# Patient Record
Sex: FEMALE | Race: WHITE | NOT HISPANIC OR LATINO | Employment: FULL TIME | URBAN - METROPOLITAN AREA
[De-identification: names, ages, dates, MRNs, and addresses within clinical notes are randomized per-mention and may not be internally consistent; named-entity substitution may affect disease eponyms.]

---

## 2017-03-17 ENCOUNTER — GENERIC CONVERSION - ENCOUNTER (OUTPATIENT)
Dept: OTHER | Facility: OTHER | Age: 42
End: 2017-03-17

## 2018-01-18 NOTE — PROGRESS NOTES
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)  4  Lipoma of torso (214 1) (D17 1)  5  Screening for iron deficiency anemia (V78 0) (Z13 0)  6  Encounter for screening for lipid disorder (V77 91) (Z13 220)  7  Morbid or severe obesity due to excess calories (278 01) (E66 01)    Plan  Encounter for screening for lipid disorder    · (1) LIPID PANEL FASTING W DIRECT LDL REFLEX; Status:Active - Retrospective By  Protocol Authorization; Requested for:19Apr2016;    · (LC) T4 and TSH; Status:Unauthorized - Requires Signature; Requested for:19Apr2016;   Encounter for screening mammogram for malignant neoplasm of breast    · Digital Bilateral Screening Mammogram With CAD; Status:Active; Requested  for:19Apr2016;   Encounter for vitamin deficiency screening    · (LC) Vitamin D, 25-Hydroxy, Total; Status:Active - Retrospective By Protocol  Authorization; Requested for:19Apr2016; Health Maintenance    · Begin a limited exercise program ; Status:Complete;   Done: 61DOY4358   · Brush your teeth 3 times a day and floss at least once a day ; Status:Complete;   Done:  78WZA0181   · Drink plenty of fluids ; Status:Complete;   Done: 28YNE5859   · We encourage all of our patients to exercise regularly  30 minutes of exercise or physical  activity five or more days a week is recommended for children and adults ;  Status:Complete;   Done: 56DQX8591   · We recommend routine visits to a dentist ; Status:Complete;   Done: 48EKU9773   · We recommend that you change your eating habits slowly ; Status:Complete;   Done:  76YNV7472   · We recommend that you follow the "Mediterranean diet "; Status:Complete;   Done:  65NJZ4972   · We recommend you modify your diet to achieve and maintain a healthy weight  Being  overweight may increase your risk for developing health problems such as diabetes,  heart disease, and cancer  Avoid high fat foods and eat a balanced diet rich  in fruits and vegetables  The combination of a reduced-calorie diet and increased  physical activity is recommended  Please let us know if you would like to  learn more about your nutrition and calorie needs, and additional options including  weight loss programs that can help you achieve your goals ; Status:Complete;   Done:  19Apr2016   · We recommend you modify your diet to achieve and maintain a healthy weight  Being  underweight may increase your risk of developing health problems from vitamin and  mineral deficiencies  We recommend a balanced diet rich in fruits and vegetables  You  may also consider increasing your calorie intake by eating more frequently or adding  nuts, avocados, and low-fat cheese or milk to your meals  Please let us know  if you would like to learn more about your nutrition and calorie needs, and additional  options to help you achieve your weight goals ; Status:Complete;   Done: 19Apr2016  Lipoma of torso    · US ABDOMINAL WALL; Status:Hold For - Scheduling; Requested for:19Apr2016; Morbid or severe obesity due to excess calories    · Some eating tips that can help you lose weight ; Status:Complete;   Done: 01CVR2911  Screening for iron deficiency anemia    · (1) COMPREHENSIVE METABOLIC PANEL; Status:Unauthorized - Requires Signature; Requested for:19Apr2016;    · (LC) CBC+Platelet+Hem Review; Status:Active - Retrospective By Protocol Authorization; Requested for:19Apr2016;     Discussion/Summary  health maintenance visit Currently, she eats an adequate diet and has an inadequate exercise regimen  Breast cancer screening: mammogram has been ordered  Colorectal cancer screening: colorectal cancer screening is not indicated  Osteoporosis screening: bone mineral density testing is not indicated  The immunizations are needed       1) schedule mammogram- routine screen for breast cancer  2) keep appt with clarita for pap in may  3) Tdap due  4) ultrasound abdomen - and surgical evaluation-likely lipoma  Chief Complaint  PT IS HERE FOR ANNUAL PHYSICAL  PT HAS ORDER FOR MAMMOGRAM       History of Present Illness  HM, Adult Female: The patient is being seen for a health maintenance evaluation  General Health: The patient's health since the last visit is described as good  She has regular dental visits  She complains of vision problems  Vision care includes wearing glasses  She denies hearing loss  Immunizations status: not up to date The patient needs the following immunization(s): tetanus vaccine  Lifestyle:  She does not have a healthy diet  She has weight concerns  She does not exercise regularly  She does not use tobacco  She consumes alcohol  She denies drug use  Reproductive health: the patient is premenopausal   she reports normal menses  Screening: cancer screening reviewed and updated  metabolic screening reviewed and updated  risk screening reviewed and updated  HPI: Patient is here for a well exam today  She was unable to lose any weight since her last visit in October  She had a sinusitis that had resolved without further issues  She denies any other illnesses over the winter  She has not been very active however plans on walking with the nice weather  She denies any chest pain or shortness of breath  She notices an area under her right breast that has been present for a few years  She had a CAT scan and ultrasound in 2012 but notices an increase in size  The area is not tender  Her bowel movements are normal  Her menstrual cycles are normal  She has a Pap scheduled with [facility] in May  She wears glasses for distance,  She complains of arthritis in her hands bilaterally  She does a lot of computer work  She takes no medications1        1 Amended By: Chick Goodell; Apr 19 2016 4:19 PM EST    Review of Systems    Constitutional: No fever, no chills, feels well, no tiredness, no recent weight gain or weight loss  Eyes: No complaints of eye pain, no red eyes, no eyesight problems, no discharge, no dry eyes, no itching of eyes  ENT: no complaints of earache, no loss of hearing, no nose bleeds, no nasal discharge, no sore throat, no hoarseness  Cardiovascular: No complaints of slow heart rate, no fast heart rate, no chest pain, no palpitations, no leg claudication, no lower extremity edema  Respiratory: No complaints of shortness of breath, no wheezing, no cough, no SOB on exertion, no orthopnea, no PND  Gastrointestinal: No complaints of abdominal pain, no constipation, no nausea or vomiting, no diarrhea, no bloody stools  Genitourinary: No complaints of dysuria, no incontinence, no pelvic pain, no dysmenorrhea, no vaginal discharge or bleeding  Musculoskeletal: No complaints of arthralgias, no myalgias, no joint swelling or stiffness, no limb pain or swelling  Integumentary: Lump under right breast, but as noted in HPI  Neurological: No complaints of headache, no confusion, no convulsions, no numbness, no dizziness or fainting, no tingling, no limb weakness, no difficulty walking  Psychiatric: Not suicidal, no sleep disturbance, no anxiety or depression, no change in personality, no emotional problems  Endocrine: No complaints of proptosis, no hot flashes, no muscle weakness, no deepening of the voice, no feelings of weakness  Hematologic/Lymphatic: No complaints of swollen glands, no swollen glands in the neck, does not bleed easily, does not bruise easily  Active Problems   1  Apudoma of skin (238 2) (D48 5)  2  Encounter for routine gynecological examination (V72 31) (Z01 419)  3  Encounter for screening mammogram for malignant neoplasm of breast (V76 12)   (Z12 31)  4  Heart burn (787 1) (R12)  5  Morbid or severe obesity due to excess calories (278 01) (E66 01)  6  Screening for malignant neoplasm of skin (V76 43) (Z12 83)  7   Vitamin D deficiency (268 9) (E55 9)    Past Medical History    · History of gastritis (V12 79) (Z87 19)   · History of Ovarian cyst (620 2) (N83 20)    Surgical History    · History of Cholecystectomy   · History of Salpingo-oophorectomy Right Side    Family History  Mother    · Family history of Thyroid Disorder (V18 19)  Father    · Family history of Diabetes Mellitus (V18 0)  Maternal Grandmother    · Family history of Lung Cancer (V16 1)  Paternal Grandfather    · Family history of Heart Disease (V17 49)   · Family history of Stroke Syndrome (V17 1)    Social History    · Alcohol Use (History)   · 2 DRINKS A WEEK   · Denied: History of Drug Use   · Never A Smoker    Current Meds  1  No Reported Medications Recorded    Allergies   1  Aleve TABS  2  Naproxen TABS    Vitals   Recorded: 31Poc1042 02:56PM Recorded: 28Yqr6975 02:54PM   Temperature  99 9 F   Heart Rate  77   Systolic 413    Diastolic 84    Height  5 ft 4 5 in   Weight  285 lb    BMI Calculated  48 16   BSA Calculated  2 29   O2 Saturation  98     Physical Exam    Constitutional   General appearance: Abnormal   OBese  Head and Face   Head and face: Normal     Palpation of the face and sinuses: No sinus tenderness  Eyes   Conjunctiva and lids: No swelling, erythema or discharge  Pupils and irises: Equal, round, reactive to light  Ears, Nose, Mouth, and Throat   External inspection of ears and nose: Normal     Otoscopic examination: Tympanic membranes translucent with normal light reflex  Canals patent without erythema  Hearing: Normal     Nasal mucosa, septum, and turbinates: Normal without edema or erythema  Lips, teeth, and gums: Normal, good dentition  Oropharynx: Normal with no erythema, edema, exudate or lesions  Neck   Neck: Supple, symmetric, trachea midline, no masses  Thyroid: Normal, no thyromegaly  Pulmonary   Respiratory effort: No increased work of breathing or signs of respiratory distress  Auscultation of lungs: Clear to auscultation      Cardiovascular   Auscultation of heart: Normal rate and rhythm, normal S1 and S2, no murmurs  Chest   Breasts: Normal, no dimpling or skin changes appreciated  Abdomen   Abdomen: Abnormal   Right upper abdomen, movable nontender area 4 cm  Liver and spleen: No hepatomegaly or splenomegaly  Examination for hernias: No hernia appreciated  Lymphatic   Palpation of lymph nodes in neck: No lymphadenopathy  Musculoskeletal   Gait and station: Normal     Digits and nails: Normal without clubbing or cyanosis  Joints, bones, and muscles: Normal     Range of motion: Normal     Stability: Normal     Muscle strength/tone: Normal     Skin   Skin and subcutaneous tissue: Abnormal   Soft, movable area right upper abdomen, nonfluctuant, skin tags around neck  Neurologic   Cortical function: Normal mental status  Psychiatric   Judgment and insight: Normal     Orientation to person, place, and time: Normal     Recent and remote memory: Intact  Mood and affect: Normal        Procedure    Procedure: Visual Acuity Test    Indication: routine screening  Inforrmation supplied by a Snellen chart  Results: 20/20/20 in both eyes without corrective device, 20/20/30 in the right eye without corrective device, 20/20/30 in the left eye without corrective device   Color vision was and the results were normal       Signatures   Electronically signed by : Eugenie Rosas DO;  Apr 19 2016  4:19PM EST                       (Author)

## 2018-03-23 ENCOUNTER — OFFICE VISIT (OUTPATIENT)
Dept: FAMILY MEDICINE CLINIC | Facility: CLINIC | Age: 43
End: 2018-03-23
Payer: COMMERCIAL

## 2018-03-23 VITALS
TEMPERATURE: 99.4 F | DIASTOLIC BLOOD PRESSURE: 78 MMHG | HEART RATE: 86 BPM | BODY MASS INDEX: 46.15 KG/M2 | OXYGEN SATURATION: 96 % | HEIGHT: 65 IN | WEIGHT: 277 LBS | SYSTOLIC BLOOD PRESSURE: 124 MMHG

## 2018-03-23 DIAGNOSIS — Z13.220 SCREENING FOR LIPID DISORDERS: ICD-10-CM

## 2018-03-23 DIAGNOSIS — Z13.0 SCREENING FOR DEFICIENCY ANEMIA: ICD-10-CM

## 2018-03-23 DIAGNOSIS — Z12.39 ENCOUNTER FOR SCREENING FOR MALIGNANT NEOPLASM OF BREAST: ICD-10-CM

## 2018-03-23 DIAGNOSIS — Z13.1 SCREENING FOR DIABETES MELLITUS: ICD-10-CM

## 2018-03-23 DIAGNOSIS — Z13.29 SCREENING FOR THYROID DISORDER: ICD-10-CM

## 2018-03-23 DIAGNOSIS — Z00.00 WELL ADULT EXAM: Primary | ICD-10-CM

## 2018-03-23 PROCEDURE — 99396 PREV VISIT EST AGE 40-64: CPT | Performed by: FAMILY MEDICINE

## 2018-03-23 NOTE — PROGRESS NOTES
Assessment/Plan:      Diagnoses and all orders for this visit:    Well adult exam    BMI 45 0-49 9, adult ([facility] [address] )    Screening for deficiency anemia  -     CBC and differential; Future  -     CBC and differential    Screening for diabetes mellitus  -     Comprehensive metabolic panel; Future  -     Comprehensive metabolic panel    Screening for lipid disorders  -     Lipid panel; Future  -     Lipid panel    Screening for thyroid disorder  -     TSH, 3rd generation with T4 reflex; Future  -     TSH, 3rd generation with T4 reflex    Encounter for screening for malignant neoplasm of breast  -     Mammo screening bilateral w cad; Future      well exam:  Patient generally overall good health  Obesity:  Discussed dietary changes, Mediterranean diet, increased activity, patient may bring in dietary log to review, fasting blood work due    Subjective:     Patient ID: Reina Valenzuela is a 43 y o  female  Patient here for physical today  She denies complaints her recent illnesses  She has some joint pain in her hands and she notices that when she is avoiding sugar that her joint symptoms improved  She has not been doing well with a voiding sugars recently  She is not very active  She has had no activity over the winter  [facility] Activity level low, however in the summer, she enjoys camping and hiking  Her periods are regular  [facility]  She denies seasonal allergy symptoms  She is a good water drinker  She gets calcium dietarily cheese          Review of Systems   Constitutional: Negative  Negative for fatigue and fever  HENT: Negative  Eyes: Negative  Respiratory: Negative  Negative for cough  Cardiovascular: Negative  Gastrointestinal: Negative  Endocrine: Negative  Genitourinary: Negative  Musculoskeletal: Negative  Skin: Negative  Allergic/Immunologic: Negative  Neurological: Negative      Psychiatric/Behavioral: Negative  The following portions of the patient's history were reviewed and updated as appropriate: allergies, current medications, past family history, past medical history, past social history, past surgical history and problem list     Objective:  Vitals:    03/23/18 1627   BP: 124/78   Pulse: 86   Temp: 99 4 °F (37 4 °C)   SpO2: 96%   Weight: 126 kg (277 lb)   Height: 5' 5" (1 651 m)      Physical Exam   Constitutional: She is oriented to person, place, and time  She appears well-developed and well-nourished  Obese   HENT:   Head: Normocephalic and atraumatic  Right Ear: External ear normal    Left Ear: External ear normal    Nose: Nose normal    Mouth/Throat: Oropharynx is clear and moist    Eyes: Conjunctivae and EOM are normal  Pupils are equal, round, and reactive to light  Neck: Normal range of motion  Neck supple  Cardiovascular: Normal rate, regular rhythm, normal heart sounds and intact distal pulses  Pulmonary/Chest: Effort normal and breath sounds normal    Abdominal: Soft  Bowel sounds are normal    Musculoskeletal: Normal range of motion  Neurological: She is alert and oriented to person, place, and time  Skin: Skin is warm and dry  Psychiatric: She has a normal mood and affect  Her behavior is normal  Judgment and thought content normal    Nursing note and vitals reviewed

## 2018-03-23 NOTE — PATIENT INSTRUCTIONS
Fasting blood work at [facility]  Schedule mammogram  Pap 4/6 with [facility]  Increase activity- walking 15 min 3 times a week, weight loss, dietary changes-Mediterranean diet given    Patient may return with dietary log to review in office  Dermatology appointment as scheduled, history of melanoma

## 2018-03-30 LAB
ALBUMIN SERPL-MCNC: 4 G/DL (ref 3.5–5.5)
ALBUMIN/GLOB SERPL: 1.4 {RATIO} (ref 1.2–2.2)
ALP SERPL-CCNC: 85 IU/L (ref 39–117)
ALT SERPL-CCNC: 17 IU/L (ref 0–32)
AST SERPL-CCNC: 16 IU/L (ref 0–40)
BASOPHILS # BLD AUTO: 0.1 X10E3/UL (ref 0–0.2)
BASOPHILS NFR BLD AUTO: 1 %
BILIRUB SERPL-MCNC: 0.2 MG/DL (ref 0–1.2)
BUN SERPL-MCNC: 15 MG/DL (ref 6–24)
BUN/CREAT SERPL: 18 (ref 9–23)
CALCIUM SERPL-MCNC: 8.7 MG/DL (ref 8.7–10.2)
CHLORIDE SERPL-SCNC: 99 MMOL/L (ref 96–106)
CHOLEST SERPL-MCNC: 133 MG/DL (ref 100–199)
CHOLEST/HDLC SERPL: 2.5 RATIO UNITS (ref 0–4.4)
CO2 SERPL-SCNC: 23 MMOL/L (ref 18–29)
CREAT SERPL-MCNC: 0.83 MG/DL (ref 0.57–1)
EOSINOPHIL # BLD AUTO: 0.2 X10E3/UL (ref 0–0.4)
EOSINOPHIL NFR BLD AUTO: 3 %
ERYTHROCYTE [DISTWIDTH] IN BLOOD BY AUTOMATED COUNT: 15.5 % (ref 12.3–15.4)
GLOBULIN SER-MCNC: 2.9 G/DL (ref 1.5–4.5)
GLUCOSE SERPL-MCNC: 100 MG/DL (ref 65–99)
HCT VFR BLD AUTO: 37 % (ref 34–46.6)
HDLC SERPL-MCNC: 54 MG/DL
HGB BLD-MCNC: 12.1 G/DL (ref 11.1–15.9)
IMM GRANULOCYTES # BLD: 0 X10E3/UL (ref 0–0.1)
IMM GRANULOCYTES NFR BLD: 0 %
LDLC SERPL CALC-MCNC: 61 MG/DL (ref 0–99)
LYMPHOCYTES # BLD AUTO: 1.8 X10E3/UL (ref 0.7–3.1)
LYMPHOCYTES NFR BLD AUTO: 26 %
MCH RBC QN AUTO: 27.5 PG (ref 26.6–33)
MCHC RBC AUTO-ENTMCNC: 32.7 G/DL (ref 31.5–35.7)
MCV RBC AUTO: 84 FL (ref 79–97)
MONOCYTES # BLD AUTO: 0.5 X10E3/UL (ref 0.1–0.9)
MONOCYTES NFR BLD AUTO: 7 %
NEUTROPHILS # BLD AUTO: 4.3 X10E3/UL (ref 1.4–7)
NEUTROPHILS NFR BLD AUTO: 63 %
PLATELET # BLD AUTO: 317 X10E3/UL (ref 150–379)
POTASSIUM SERPL-SCNC: 4.5 MMOL/L (ref 3.5–5.2)
PROT SERPL-MCNC: 6.9 G/DL (ref 6–8.5)
RBC # BLD AUTO: 4.4 X10E6/UL (ref 3.77–5.28)
SL AMB EGFR AFRICAN AMERICAN: 101 ML/MIN/1.73
SL AMB EGFR NON AFRICAN AMERICAN: 87 ML/MIN/1.73
SL AMB VLDL CHOLESTEROL CALC: 18 MG/DL (ref 5–40)
SODIUM SERPL-SCNC: 136 MMOL/L (ref 134–144)
TRIGL SERPL-MCNC: 91 MG/DL (ref 0–149)
TSH SERPL DL<=0.005 MIU/L-ACNC: 1.52 UIU/ML (ref 0.45–4.5)
WBC # BLD AUTO: 6.9 X10E3/UL (ref 3.4–10.8)

## 2019-04-16 ENCOUNTER — DOCUMENTATION (OUTPATIENT)
Dept: FAMILY MEDICINE CLINIC | Facility: CLINIC | Age: 44
End: 2019-04-16

## 2019-10-16 ENCOUNTER — OFFICE VISIT (OUTPATIENT)
Dept: FAMILY MEDICINE CLINIC | Facility: CLINIC | Age: 44
End: 2019-10-16
Payer: COMMERCIAL

## 2019-10-16 VITALS
BODY MASS INDEX: 43.85 KG/M2 | DIASTOLIC BLOOD PRESSURE: 80 MMHG | SYSTOLIC BLOOD PRESSURE: 124 MMHG | TEMPERATURE: 98.8 F | WEIGHT: 263.5 LBS | HEART RATE: 69 BPM | OXYGEN SATURATION: 97 %

## 2019-10-16 DIAGNOSIS — S29.012A STRAIN OF THORACIC BACK REGION: Primary | ICD-10-CM

## 2019-10-16 PROCEDURE — 99213 OFFICE O/P EST LOW 20 MIN: CPT | Performed by: FAMILY MEDICINE

## 2019-10-16 RX ORDER — CYCLOBENZAPRINE HCL 10 MG
10 TABLET ORAL 3 TIMES DAILY PRN
Qty: 10 TABLET | Refills: 0 | Status: SHIPPED | OUTPATIENT
Start: 2019-10-16 | End: 2022-03-30 | Stop reason: ALTCHOICE

## 2019-10-16 NOTE — PATIENT INSTRUCTIONS
CYCLOBENZAPRINE 10MG 1/2 -1 TAB UP TO 3 TIMES A DAY as needed  HEAT 15 MIN ON AND OFF  Consider physical therapy

## 2019-10-16 NOTE — PROGRESS NOTES
Assessment/Plan:      Diagnoses and all orders for this visit:    Strain of thoracic back region  -     cyclobenzaprine (FLEXERIL) 10 mg tablet; Take 1 tablet (10 mg total) by mouth 3 (three) times a day as needed for muscle spasms          Subjective:  Chief Complaint   Patient presents with    Pain     PT STARTED MONDAY WITH PAIN ON UPPER LEFT BACK , UNDER SHOULDER BLADE  PT STATES IT IS A STABBING PAIN WITH ROM   PT WILL SCHEDULE YEARLY PHSICAL   Patient ID: Rogelio Seip is a 40 y o  female  SYMPTOMS STARTED ON Monday, SYMPTOMS of sharp pain on her left upper back just below the left scapula  Denies any particular injury   TRIED TENS UNIT AND IBUPROFEN WITHOUT RELIEF   History of cholecystectomy  No chest pain, no shortness of breath  Increased pain with deep breath  Review of Systems   Constitutional: Negative  Negative for fatigue and fever  HENT: Negative  Eyes: Negative  Respiratory: Negative  Negative for cough  Cardiovascular: Negative  Gastrointestinal: Negative  Endocrine: Negative  Genitourinary: Negative  Musculoskeletal: Positive for back pain  Skin: Negative  Allergic/Immunologic: Negative  Neurological: Negative  Psychiatric/Behavioral: Negative  The following portions of the patient's history were reviewed and updated as appropriate: allergies, current medications, past family history, past medical history, past social history, past surgical history and problem list     Objective:  Vitals:    10/16/19 1606   BP: 124/80   Pulse: 69   Temp: 98 8 °F (37 1 °C)   SpO2: 97%   Weight: 120 kg (263 lb 8 oz)      Physical Exam   Constitutional: She is oriented to person, place, and time  She appears well-developed and well-nourished  HENT:   Head: Normocephalic and atraumatic  Cardiovascular: Normal rate, regular rhythm, normal heart sounds and intact distal pulses     Pulmonary/Chest: Effort normal and breath sounds normal    Abdominal: Soft  Bowel sounds are normal    Musculoskeletal: She exhibits tenderness  She exhibits no edema  Tenderness reproducible over thoracic area T5-T7 left    Neurological: She is alert and oriented to person, place, and time  Skin: Skin is warm and dry  Psychiatric: She has a normal mood and affect  Her behavior is normal  Judgment and thought content normal    Nursing note and vitals reviewed  BMI Counseling: Body mass index is 43 85 kg/m²  The BMI is above normal  Nutrition recommendations include reducing portion sizes and 3-5 servings of fruits/vegetables daily  Exercise recommendations include exercising 3-5 times per week

## 2019-10-17 PROBLEM — S29.012A STRAIN OF THORACIC BACK REGION: Status: ACTIVE | Noted: 2019-10-17

## 2019-10-17 PROBLEM — Z00.00 WELL ADULT EXAM: Status: RESOLVED | Noted: 2018-03-23 | Resolved: 2019-10-17

## 2022-03-30 ENCOUNTER — OFFICE VISIT (OUTPATIENT)
Dept: FAMILY MEDICINE CLINIC | Facility: CLINIC | Age: 47
End: 2022-03-30
Payer: COMMERCIAL

## 2022-03-30 VITALS
DIASTOLIC BLOOD PRESSURE: 72 MMHG | WEIGHT: 253 LBS | SYSTOLIC BLOOD PRESSURE: 130 MMHG | TEMPERATURE: 95.4 F | OXYGEN SATURATION: 94 % | HEART RATE: 82 BPM | BODY MASS INDEX: 43.19 KG/M2 | HEIGHT: 64 IN

## 2022-03-30 DIAGNOSIS — Z13.0 SCREENING FOR ENDOCRINE, METABOLIC AND IMMUNITY DISORDER: ICD-10-CM

## 2022-03-30 DIAGNOSIS — Z12.31 SCREENING MAMMOGRAM FOR BREAST CANCER: ICD-10-CM

## 2022-03-30 DIAGNOSIS — Z00.00 WELL ADULT EXAM: Primary | ICD-10-CM

## 2022-03-30 DIAGNOSIS — Z13.228 SCREENING FOR ENDOCRINE, METABOLIC AND IMMUNITY DISORDER: ICD-10-CM

## 2022-03-30 DIAGNOSIS — Z13.220 SCREENING FOR LIPOID DISORDERS: ICD-10-CM

## 2022-03-30 DIAGNOSIS — Z13.29 SCREENING FOR ENDOCRINE, METABOLIC AND IMMUNITY DISORDER: ICD-10-CM

## 2022-03-30 DIAGNOSIS — Z13.29 SCREENING FOR THYROID DISORDER: ICD-10-CM

## 2022-03-30 PROCEDURE — 99396 PREV VISIT EST AGE 40-64: CPT | Performed by: FAMILY MEDICINE

## 2022-03-30 RX ORDER — PHENTERMINE HYDROCHLORIDE 37.5 MG/1
37.5 TABLET ORAL DAILY
Qty: 30 TABLET | Refills: 0 | Status: SHIPPED | OUTPATIENT
Start: 2022-03-30 | End: 2022-06-02 | Stop reason: SDUPTHER

## 2022-03-30 NOTE — PROGRESS NOTES
Tea Green is a 55 y o   female and is here for routine health maintenance  The patient reports trouble losing weight    History of Present Illness     Pt is here for a physical       Well Adult Physical   Patient here for a comprehensive physical exam       Diet and Physical Activity  Diet: well balanced diet  Weight concerns: Patient has class 3 obesity (BMI >40)  Exercise: frequently      Depression Screen  PHQ-2/9 Depression Screening    Little interest or pleasure in doing things: 0 - not at all  Feeling down, depressed, or hopeless: 0 - not at all  PHQ-2 Score: 0  PHQ-2 Interpretation: Negative depression screen          General Health  Hearing: Normal:  bilateral  Vision: no vision problems  Dental: regular dental visits     History:  LMP: No LMP recorded  Cancer Screening  Colononoscopy   Mammogram schedule   Pap schedule with [facility]   Abnormal pap? no  Smoker NO Annual screening with low-dose helical computed tomography (CT) for patients age 54 to 76 years with history of smoking at least 30 pack-years and, if a former smoker, had quit within the previous 15 years      The following portions of the patient's history were reviewed and updated as appropriate: allergies, current medications, past family history, past medical history, past social history, past surgical history and problem list     Review of Systems     Review of Systems   Constitutional: Negative  Negative for fatigue and fever  HENT: Negative  Eyes: Negative  Respiratory: Negative  Negative for cough  Cardiovascular: Negative  Gastrointestinal: Negative  Endocrine: Negative  Genitourinary: Negative  Musculoskeletal: Negative  Skin: Negative  Allergic/Immunologic: Negative  Neurological: Negative  Psychiatric/Behavioral: Negative          Past Medical History     Past Medical History:   Diagnosis Date    History of ovarian cyst        Past Surgical History     Past Surgical History:   Procedure Laterality Date    CHOLECYSTECTOMY      SALPINGOOPHORECTOMY Right     resolved: 02/08/2010       Social History     Social History     Socioeconomic History    Marital status: /Civil Union     Spouse name: None    Number of children: None    Years of education: None    Highest education level: None   Occupational History    None   Tobacco Use    Smoking status: Never Smoker    Smokeless tobacco: Never Used   Substance and Sexual Activity    Alcohol use: Yes     Comment: SOCIAL     Drug use: None    Sexual activity: None   Other Topics Concern    None   Social History Narrative    None     Social Determinants of Health     Financial Resource Strain: Not on file   Food Insecurity: Not on file   Transportation Needs: Not on file   Physical Activity: Not on file   Stress: Not on file   Social Connections: Not on file   Intimate Partner Violence: Not on file   Housing Stability: Not on file       Family History     Family History   Problem Relation Age of Onset    Thyroid disease Mother     Diabetes Father     Lung cancer Maternal Grandmother     Heart disease Paternal Grandfather     Stroke Paternal Grandfather        Current Medications       Current Outpatient Medications:     phentermine (ADIPEX-P) 37 5 MG tablet, Take 1 tablet (37 5 mg total) by mouth daily, Disp: 30 tablet, Rfl: 0     Allergies     Allergies   Allergen Reactions    Naproxen Angioedema, Rash, Hives and Lip Swelling     Reaction Date: [id number]; Category: Allergy; Reaction Date: 11Jul2011; Category:  Allergy;        Objective     /72   Pulse 82   Temp (!) 95 4 °F (35 2 °C)   Ht 5' 4" (1 626 m)   Wt 115 kg (253 lb)   SpO2 94%   BMI 43 43 kg/m²      Physical Exam      No exam data present    Health Maintenance     Health Maintenance   Topic Date Due    Hepatitis C Screening  Never done    COVID-19 Vaccine (1) Never done    HIV Screening  Never done    DTaP,Tdap,and Td Vaccines (1 - Tdap) Never done    Breast Cancer Screening: Mammogram  Never done    Influenza Vaccine (1) 09/01/2021    Depression Screening  03/30/2023    BMI: Followup Plan  03/30/2023    BMI: Adult  03/30/2023    Annual Physical  03/30/2023    Cervical Cancer Screening  02/13/2024    Pneumococcal Vaccine: Pediatrics (0 to 5 Years) and At-Risk Patients (6 to 59 Years)  Aged Out    HIB Vaccine  Aged Out    Hepatitis B Vaccine  Aged Out    IPV Vaccine  Aged Out    Hepatitis A Vaccine  Aged Out    Meningococcal ACWY Vaccine  Aged Out    HPV Vaccine  Aged [employer] History   Administered Date(s) Administered    Influenza Injectable, MDCK, Preservative Free, Quadrivalent, 0 5 mL 11/14/2019       Assessment/Plan         1  Healthy female exam   2  Patient Counseling:   · Nutrition: Stressed importance of a well balanced diet, moderation of sodium/saturated fat, caloric balance and sufficient intake of fiber  · Exercise: Stressed the importance of regular exercise with a goal of 150 minutes per week  · Dental Health: Discussed daily flossing and brushing and regular dental visits     · Immunizations reviewed  · Discussed benefits of screening   · Discussed the patient's BMI with her  The BMI is above average; BMI management plan is completed  3  Cancer Screening   4  Labs   5  Schedule blood work, mammogram and pap  Start phentermine  6   Follow up in 3 months    Ashleigh Hermosillo DO

## 2022-03-30 NOTE — PATIENT INSTRUCTIONS
Fasting blood work - [facility]   Schedule mammogram  Schedule pap - [facility]   Phentermine 1 tab daily     Wellness Visit for Adults   AMBULATORY CARE:   A wellness visit  is when you see your healthcare provider to get screened for health problems  Your healthcare provider will also give you advice on how to stay healthy  Write down your questions so you remember to ask them  Ask your healthcare provider how often you should have a wellness visit  What happens at a wellness visit:  Your healthcare provider will ask about your health, and your family history of health problems  This includes high blood pressure, heart disease, and cancer  He or she will ask if you have symptoms that concern you, if you smoke, and about your mood  You may also be asked about your intake of medicines, supplements, food, and alcohol  Any of the following may be done:  · Your weight  will be checked  Your height may also be checked so your body mass index (BMI) can be calculated  Your BMI shows if you are at a healthy weight  · Your blood pressure  and heart rate will be checked  Your temperature may also be checked  · Blood and urine tests  may be done  Blood tests may be done to check your cholesterol levels  Abnormal cholesterol levels increase your risk for heart disease and stroke  You may also need a blood or urine test to check for diabetes if you are at increased risk  Urine tests may be done to look for signs of an infection or kidney disease  · A physical exam  includes checking your heartbeat and lungs with a stethoscope  Your healthcare provider may also check your skin to look for sun damage  · Screening tests  may be recommended  A screening test is done to check for diseases that may not cause symptoms  The screening tests you may need depend on your age, gender, family history, and lifestyle habits  For example, colorectal screening may be recommended if you are 48years old or older      Screening tests you need if you are a woman:   · A Pap smear  is used to screen for cervical cancer  Pap smears are usually done every 3 to 5 years depending on your age  You may need them more often if you have had abnormal Pap smear test results in the past  Ask your healthcare provider how often you should have a Pap smear  · A mammogram  is an x-ray of your breasts to screen for breast cancer  Experts recommend mammograms every 2 years starting at age 48 years  You may need a mammogram at age 52 years or younger if you have an increased risk for breast cancer  Talk to your healthcare provider about when you should start having mammograms and how often you need them  Vaccines you may need:   · Get an influenza vaccine  every year  The influenza vaccine protects you from the flu  Several types of viruses cause the flu  The viruses change over time, so new vaccines are made each year  · Get a tetanus-diphtheria (Td) booster vaccine  every 10 years  This vaccine protects you against tetanus and diphtheria  Tetanus is a severe infection that may cause painful muscle spasms and lockjaw  Diphtheria is a severe bacterial infection that causes a thick covering in the back of your mouth and throat  · Get a human papillomavirus (HPV) vaccine  if you are female and aged 23 to 32 or male 23 to 24 and never received it  This vaccine protects you from HPV infection  HPV is the most common infection spread by sexual contact  HPV may also cause vaginal, penile, and anal cancers  · Get a pneumococcal vaccine  if you are aged 72 years or older  The pneumococcal vaccine is an injection given to protect you from pneumococcal disease  Pneumococcal disease is an infection caused by pneumococcal bacteria  The infection may cause pneumonia, meningitis, or an ear infection  · Get a shingles vaccine  if you are 60 or older, even if you have had shingles before   The shingles vaccine is an injection to protect you from the varicella-zoster virus  This is the same virus that causes chickenpox  Shingles is a painful rash that develops in people who had chickenpox or have been exposed to the virus  How to eat healthy:  My Plate is a model for planning healthy meals  It shows the types and amounts of foods that should go on your plate  Fruits and vegetables make up about half of your plate, and grains and protein make up the other half  A serving of dairy is included on the side of your plate  The amount of calories and serving sizes you need depends on your age, gender, weight, and height  Examples of healthy foods are listed below:  · Eat a variety of vegetables  such as dark green, red, and orange vegetables  You can also include canned vegetables low in sodium (salt) and frozen vegetables without added butter or sauces  · Eat a variety of fresh fruits , canned fruit in 100% juice, frozen fruit, and dried fruit  · Include whole grains  At least half of the grains you eat should be whole grains  Examples include whole-wheat bread, wheat pasta, brown rice, and whole-grain cereals such as oatmeal     · Eat a variety of protein foods such as seafood (fish and shellfish), lean meat, and poultry without skin (turkey and chicken)  Examples of lean meats include pork leg, shoulder, or tenderloin, and beef round, sirloin, tenderloin, and extra lean ground beef  Other protein foods include eggs and egg substitutes, beans, peas, soy products, nuts, and seeds  · Choose low-fat dairy products such as skim or 1% milk or low-fat yogurt, cheese, and cottage cheese  · Limit unhealthy fats  such as butter, hard margarine, and shortening  Exercise:  Exercise at least 30 minutes per day on most days of the week  Some examples of exercise include walking, biking, dancing, and swimming  You can also fit in more physical activity by taking the stairs instead of the elevator or parking farther away from stores   Include muscle strengthening activities 2 days each week  Regular exercise provides many health benefits  It helps you manage your weight, and decreases your risk for type 2 diabetes, heart disease, stroke, and high blood pressure  Exercise can also help improve your mood  Ask your healthcare provider about the best exercise plan for you  General health and safety guidelines:   · Do not smoke  Nicotine and other chemicals in cigarettes and cigars can cause lung damage  Ask your healthcare provider for information if you currently smoke and need help to quit  E-cigarettes or smokeless tobacco still contain nicotine  Talk to your healthcare provider before you use these products  · Limit alcohol  A drink of alcohol is 12 ounces of beer, 5 ounces of wine, or 1½ ounces of liquor  · Lose weight, if needed  Being overweight increases your risk of certain health conditions  These include heart disease, high blood pressure, type 2 diabetes, and certain types of cancer  · Protect your skin  Do not sunbathe or use tanning beds  Use sunscreen with a SPF 15 or higher  Apply sunscreen at least 15 minutes before you go outside  Reapply sunscreen every 2 hours  Wear protective clothing, hats, and sunglasses when you are outside  · Drive safely  Always wear your seatbelt  Make sure everyone in your car wears a seatbelt  A seatbelt can save your life if you are in an accident  Do not use your cell phone when you are driving  This could distract you and cause an accident  Pull over if you need to make a call or send a text message  · Practice safe sex  Use latex condoms if are sexually active and have more than one partner  Your healthcare provider may recommend screening tests for sexually transmitted infections (STIs)  · Wear helmets, lifejackets, and protective gear  Always wear a helmet when you ride a bike or motorcycle, go skiing, or play sports that could cause a head injury  Wear protective equipment when you play sports   Wear a loretta when you are on a boat or doing water sports  © Copyright [employer] 2022 Information is for End User's use only and may not be sold, redistributed or otherwise used for commercial purposes  All illustrations and images included in CareNotes® are the copyrighted property of A D A M [employer] Inc  or Mary Muhammad  The above information is an  only  It is not intended as medical advice for individual conditions or treatments  Talk to your doctor, nurse or pharmacist before following any medical regimen to see if it is safe and effective for you

## 2022-03-31 ENCOUNTER — TELEPHONE (OUTPATIENT)
Dept: FAMILY MEDICINE CLINIC | Facility: CLINIC | Age: 47
End: 2022-03-31

## 2022-03-31 NOTE — TELEPHONE ENCOUNTER
Lmom for patient to call  Her phentermine was denied  It is not a covered pharmacy benefit      Patient can use [facility] at her pharmacy and the cost is around $10

## 2022-04-07 ENCOUNTER — VBI (OUTPATIENT)
Dept: ADMINISTRATIVE | Facility: OTHER | Age: 47
End: 2022-04-07

## 2022-04-07 NOTE — TELEPHONE ENCOUNTER
Upon review of the In Basket request we were able to locate, review, and update the patient chart as requested for Pap Smear (HPV) aka Cervical Cancer Screening  Any additional questions or concerns should be emailed to the Practice Liaisons via [email or web address] email, please do not reply via In Basket      Thank you  Ezio Benton

## 2022-04-14 ENCOUNTER — ANNUAL EXAM (OUTPATIENT)
Dept: OBGYN CLINIC | Facility: CLINIC | Age: 47
End: 2022-04-14
Payer: COMMERCIAL

## 2022-04-14 VITALS
BODY MASS INDEX: 42.68 KG/M2 | HEIGHT: 64 IN | SYSTOLIC BLOOD PRESSURE: 124 MMHG | DIASTOLIC BLOOD PRESSURE: 80 MMHG | WEIGHT: 250 LBS

## 2022-04-14 DIAGNOSIS — Z12.31 ENCOUNTER FOR SCREENING MAMMOGRAM FOR BREAST CANCER: ICD-10-CM

## 2022-04-14 DIAGNOSIS — Z12.4 ENCOUNTER FOR PAPANICOLAOU SMEAR FOR CERVICAL CANCER SCREENING: ICD-10-CM

## 2022-04-14 DIAGNOSIS — Z01.419 ENCOUNTER FOR GYNECOLOGICAL EXAMINATION WITHOUT ABNORMAL FINDING: Primary | ICD-10-CM

## 2022-04-14 PROCEDURE — 3008F BODY MASS INDEX DOCD: CPT | Performed by: FAMILY MEDICINE

## 2022-04-14 PROCEDURE — S0612 ANNUAL GYNECOLOGICAL EXAMINA: HCPCS | Performed by: NURSE PRACTITIONER

## 2022-04-14 RX ORDER — MELATONIN
1000 DAILY
COMMUNITY

## 2022-04-14 NOTE — PATIENT INSTRUCTIONS
Calcium 1000 mg + 600-1000 IU Vit D daily  Pap with high risk HPV Q 3-5 years,     Annual mammogram, monthly breast self exam  Birth control as directed  Benefits, risks and alternatives discussed/reviewed  Monitor cycles  Call periods coming closer than 21 days start to start  Lasting longer than 10 days  Soaking a super plus pad tampon in an hour for several hours

## 2022-04-14 NOTE — PROGRESS NOTES
Assessment/Plan:  Calcium 1000 mg + 600-1000 IU Vit D daily  Pap with high risk HPV Q 3-5 years,     Annual mammogram, monthly breast self exam  Birth control as directed  Benefits, risks and alternatives discussed/reviewed  Monitor cycles  Call periods coming closer than 21 days start to start  Lasting longer than 10 days  Soaking a super plus pad tampon in an hour for several hours  Diagnoses and all orders for this visit:    Encounter for gynecological examination without abnormal finding  -     IGP, Aptima HPV, Rfx 16/18,45    Encounter for screening mammogram for breast cancer    Encounter for Papanicolaou smear for cervical cancer screening  -     IGP, Aptima HPV, Rfx 16/18,45    Other orders  -     cholecalciferol (VITAMIN D3) 1,000 units tablet; Take 1,000 Units by mouth daily          Subjective:      Patient ID: Klarissa Ventura is a 55 y o  female  Here for annual gyn Doing well Noticing periods are monthly but lasting longer and are heavier Last 5-7 days and changes every 2 hours on heavy days  Denies abdominal or pelvic pain  Bowel and bladder are normal PAP 2/2019 neg/neg No h/o abn pap  Mammo appt 6/2022        The following portions of the patient's history were reviewed and updated as appropriate: allergies, current medications, past family history, past medical history, past social history, past surgical history and problem list     Review of Systems   Constitutional: Negative for fatigue and unexpected weight change  Gastrointestinal: Negative for abdominal distention, abdominal pain, constipation and diarrhea  Genitourinary: Negative for difficulty urinating, dyspareunia, dysuria, frequency, genital sores, menstrual problem, pelvic pain, urgency, vaginal bleeding, vaginal discharge and vaginal pain  Neurological: Negative for headaches  Psychiatric/Behavioral: Negative  Negative for dysphoric mood  The patient is not nervous/anxious            Objective:      /80   Ht 5' 4" (1 626 m)   Wt 113 kg (250 lb)   LMP 04/06/2022 (Exact Date)   Breastfeeding No   BMI 42 91 kg/m²          Physical Exam  Vitals and nursing note reviewed  Constitutional:       General: She is not in acute distress  Appearance: Normal appearance  HENT:      Head: Normocephalic and atraumatic  Pulmonary:      Effort: Pulmonary effort is normal    Chest:   Breasts: Breasts are symmetrical       Right: Normal  No mass, nipple discharge, skin change, tenderness or axillary adenopathy  Left: Normal  No mass, nipple discharge, skin change, tenderness or axillary adenopathy  Abdominal:      General: There is no distension  Palpations: Abdomen is soft  Tenderness: There is no abdominal tenderness  There is no guarding or rebound  Genitourinary:     General: Normal vulva  Exam position: Lithotomy position  Labia:         Right: No rash, tenderness, lesion or injury  Left: No rash, tenderness, lesion or injury  Urethra: No prolapse, urethral pain, urethral swelling or urethral lesion  Vagina: Normal  No erythema or lesions  Cervix: No cervical motion tenderness, discharge, lesion or cervical bleeding  Uterus: Normal        Adnexa: Right adnexa normal and left adnexa normal         Right: No mass or tenderness  Left: No mass or tenderness  Rectum: No mass or external hemorrhoid  Musculoskeletal:         General: Normal range of motion  Lymphadenopathy:      Upper Body:      Right upper body: No axillary adenopathy  Left upper body: No axillary adenopathy  Lower Body: No right inguinal adenopathy  No left inguinal adenopathy  Skin:     General: Skin is warm and dry  Neurological:      Mental Status: She is alert and oriented to person, place, and time  Psychiatric:         Mood and Affect: Mood normal          Behavior: Behavior normal          Thought Content:  Thought content normal          Judgment: Judgment normal

## 2022-04-18 LAB
CYTOLOGIST CVX/VAG CYTO: NORMAL
DX ICD CODE: NORMAL
HPV I/H RISK 4 DNA CVX QL PROBE+SIG AMP: NEGATIVE
OTHER STN SPEC: NORMAL
PATH REPORT.FINAL DX SPEC: NORMAL
SL AMB NOTE:: NORMAL
SL AMB SPECIMEN ADEQUACY: NORMAL
SL AMB TEST METHODOLOGY: NORMAL

## 2022-04-19 LAB
ALBUMIN SERPL-MCNC: 3.9 G/DL (ref 3.8–4.8)
ALBUMIN/GLOB SERPL: 1.4 {RATIO} (ref 1.2–2.2)
ALP SERPL-CCNC: 85 IU/L (ref 44–121)
ALT SERPL-CCNC: 11 IU/L (ref 0–32)
AST SERPL-CCNC: 14 IU/L (ref 0–40)
BASOPHILS # BLD AUTO: 0.1 X10E3/UL (ref 0–0.2)
BASOPHILS NFR BLD AUTO: 1 %
BILIRUB SERPL-MCNC: <0.2 MG/DL (ref 0–1.2)
BUN SERPL-MCNC: 10 MG/DL (ref 6–24)
BUN/CREAT SERPL: 13 (ref 9–23)
CALCIUM SERPL-MCNC: 8.8 MG/DL (ref 8.7–10.2)
CHLORIDE SERPL-SCNC: 101 MMOL/L (ref 96–106)
CHOLEST SERPL-MCNC: 153 MG/DL (ref 100–199)
CO2 SERPL-SCNC: 23 MMOL/L (ref 20–29)
CREAT SERPL-MCNC: 0.77 MG/DL (ref 0.57–1)
EGFR: 96 ML/MIN/1.73
EOSINOPHIL # BLD AUTO: 0.2 X10E3/UL (ref 0–0.4)
EOSINOPHIL NFR BLD AUTO: 3 %
ERYTHROCYTE [DISTWIDTH] IN BLOOD BY AUTOMATED COUNT: 14.6 % (ref 11.7–15.4)
GLOBULIN SER-MCNC: 2.8 G/DL (ref 1.5–4.5)
GLUCOSE SERPL-MCNC: 94 MG/DL (ref 65–99)
HCT VFR BLD AUTO: 36.2 % (ref 34–46.6)
HDLC SERPL-MCNC: 68 MG/DL
HGB BLD-MCNC: 11.3 G/DL (ref 11.1–15.9)
IMM GRANULOCYTES # BLD: 0.1 X10E3/UL (ref 0–0.1)
IMM GRANULOCYTES NFR BLD: 1 %
LDLC SERPL CALC-MCNC: 66 MG/DL (ref 0–99)
LDLC/HDLC SERPL: 1 RATIO (ref 0–3.2)
LYMPHOCYTES # BLD AUTO: 2 X10E3/UL (ref 0.7–3.1)
LYMPHOCYTES NFR BLD AUTO: 25 %
MCH RBC QN AUTO: 25.4 PG (ref 26.6–33)
MCHC RBC AUTO-ENTMCNC: 31.2 G/DL (ref 31.5–35.7)
MCV RBC AUTO: 81 FL (ref 79–97)
MONOCYTES # BLD AUTO: 0.6 X10E3/UL (ref 0.1–0.9)
MONOCYTES NFR BLD AUTO: 8 %
NEUTROPHILS # BLD AUTO: 5 X10E3/UL (ref 1.4–7)
NEUTROPHILS NFR BLD AUTO: 62 %
PLATELET # BLD AUTO: 366 X10E3/UL (ref 150–450)
POTASSIUM SERPL-SCNC: 4.6 MMOL/L (ref 3.5–5.2)
PROT SERPL-MCNC: 6.7 G/DL (ref 6–8.5)
RBC # BLD AUTO: 4.45 X10E6/UL (ref 3.77–5.28)
SL AMB VLDL CHOLESTEROL CALC: 19 MG/DL (ref 5–40)
SODIUM SERPL-SCNC: 137 MMOL/L (ref 134–144)
TRIGL SERPL-MCNC: 103 MG/DL (ref 0–149)
TSH SERPL DL<=0.005 MIU/L-ACNC: 2.17 UIU/ML (ref 0.45–4.5)
WBC # BLD AUTO: 8 X10E3/UL (ref 3.4–10.8)

## 2022-04-20 ENCOUNTER — TELEPHONE (OUTPATIENT)
Dept: FAMILY MEDICINE CLINIC | Facility: CLINIC | Age: 47
End: 2022-04-20

## 2022-06-02 ENCOUNTER — HOSPITAL ENCOUNTER (OUTPATIENT)
Dept: MAMMOGRAPHY | Facility: IMAGING CENTER | Age: 47
Discharge: HOME | End: 2022-06-02
Payer: COMMERCIAL

## 2022-06-02 VITALS — BODY MASS INDEX: 43.54 KG/M2 | HEIGHT: 64 IN | WEIGHT: 255 LBS

## 2022-06-02 DIAGNOSIS — Z12.31 SCREENING MAMMOGRAM FOR BREAST CANCER: ICD-10-CM

## 2022-06-02 PROCEDURE — 77067 SCR MAMMO BI INCL CAD: CPT

## 2022-06-02 PROCEDURE — 77063 BREAST TOMOSYNTHESIS BI: CPT

## 2022-06-03 RX ORDER — PHENTERMINE HYDROCHLORIDE 37.5 MG/1
37.5 TABLET ORAL DAILY
Qty: 30 TABLET | Refills: 0 | Status: SHIPPED | OUTPATIENT
Start: 2022-06-03 | End: 2022-07-26 | Stop reason: SDUPTHER

## 2022-06-29 ENCOUNTER — HOSPITAL ENCOUNTER (OUTPATIENT)
Dept: MAMMOGRAPHY | Facility: CLINIC | Age: 47
Discharge: HOME | End: 2022-06-29
Payer: COMMERCIAL

## 2022-06-29 ENCOUNTER — HOSPITAL ENCOUNTER (OUTPATIENT)
Dept: ULTRASOUND IMAGING | Facility: CLINIC | Age: 47
Discharge: HOME | End: 2022-06-29
Payer: COMMERCIAL

## 2022-06-29 VITALS — BODY MASS INDEX: 43.54 KG/M2 | WEIGHT: 255 LBS | HEIGHT: 64 IN

## 2022-06-29 DIAGNOSIS — R92.8 ABNORMAL MAMMOGRAM: ICD-10-CM

## 2022-06-29 PROCEDURE — 77065 DX MAMMO INCL CAD UNI: CPT

## 2022-06-29 PROCEDURE — G0279 TOMOSYNTHESIS, MAMMO: HCPCS

## 2022-06-30 ENCOUNTER — TELEPHONE (OUTPATIENT)
Dept: FAMILY MEDICINE CLINIC | Facility: CLINIC | Age: 47
End: 2022-06-30

## 2022-06-30 NOTE — TELEPHONE ENCOUNTER
----- Message from Lokesh Taylor DO sent at 6/29/2022  5:16 PM EDT -----  Your diagnostic mammogram is normal  No abnormalities  Repeat your regular mammogram in 1 year

## 2022-07-26 RX ORDER — PHENTERMINE HYDROCHLORIDE 37.5 MG/1
37.5 TABLET ORAL DAILY
Qty: 30 TABLET | Refills: 0 | Status: SHIPPED | OUTPATIENT
Start: 2022-07-26 | End: 2022-08-29 | Stop reason: SDUPTHER

## 2022-08-29 RX ORDER — PHENTERMINE HYDROCHLORIDE 37.5 MG/1
37.5 TABLET ORAL DAILY
Qty: 30 TABLET | Refills: 0 | Status: SHIPPED | OUTPATIENT
Start: 2022-08-29

## 2022-10-11 PROBLEM — Z00.00 WELL ADULT EXAM: Status: RESOLVED | Noted: 2018-03-23 | Resolved: 2022-10-11

## 2022-11-11 RX ORDER — PHENTERMINE HYDROCHLORIDE 37.5 MG/1
37.5 TABLET ORAL DAILY
Qty: 30 TABLET | Refills: 0 | Status: SHIPPED | OUTPATIENT
Start: 2022-11-11

## 2023-01-03 RX ORDER — PHENTERMINE HYDROCHLORIDE 37.5 MG/1
37.5 TABLET ORAL DAILY
Qty: 30 TABLET | Refills: 0 | Status: SHIPPED | OUTPATIENT
Start: 2023-01-03

## 2023-02-07 ENCOUNTER — OFFICE VISIT (OUTPATIENT)
Dept: FAMILY MEDICINE CLINIC | Facility: CLINIC | Age: 48
End: 2023-02-07

## 2023-02-07 VITALS
WEIGHT: 266 LBS | BODY MASS INDEX: 45.41 KG/M2 | HEIGHT: 64 IN | OXYGEN SATURATION: 92 % | TEMPERATURE: 98.8 F | DIASTOLIC BLOOD PRESSURE: 87 MMHG | HEART RATE: 72 BPM | SYSTOLIC BLOOD PRESSURE: 136 MMHG

## 2023-02-07 DIAGNOSIS — H61.21 IMPACTED CERUMEN OF RIGHT EAR: Primary | ICD-10-CM

## 2023-02-07 NOTE — PROGRESS NOTES
Name: Juan Jose Kumar      : 1975      MRN: [id number]  Encounter Provider: MICHELLE Hilton  Encounter Date: 2023   Encounter department: [place] [address]  Impacted cerumen of right ear  Assessment & Plan:  Use debrox nightly, return to office in 4-5 days for repeat irrigation  Do not use qtips           Subjective      Here today for cerumen removal from right ear  Has significant decrease in hearing on right side  Does use qtips  Left ear doesn't seem to be an issue  Review of Systems   Constitutional: Negative  HENT: Positive for hearing loss (right decreased)  Negative for voice change  Eyes: Negative  Respiratory: Negative  Cardiovascular: Negative  Gastrointestinal: Negative  Neurological: Negative for dizziness  Current Outpatient Medications on File Prior to Visit   Medication Sig   • cholecalciferol (VITAMIN D3) 1,000 units tablet Take 1,000 Units by mouth daily   • phentermine (ADIPEX-P) 37 5 MG tablet Take 1 tablet (37 5 mg total) by mouth daily       Objective     /87   Pulse 72   Temp 98 8 °F (37 1 °C) (Tympanic)   Ht 5' 4" (1 626 m)   Wt 121 kg (266 lb)   SpO2 92%   BMI 45 66 kg/m²     Physical Exam  Vitals and nursing note reviewed  Constitutional:       Appearance: Normal appearance  HENT:      Right Ear: There is impacted cerumen  Left Ear: Tympanic membrane, ear canal and external ear normal       Ears:      Comments: Unable to irrigate canal completely after multiple attempts with irrigation and curette  Cardiovascular:      Rate and Rhythm: Normal rate and regular rhythm  Heart sounds: Normal heart sounds  No murmur heard  Neurological:      Mental Status: She is alert  Ear cerumen removal    Date/Time: 2023 3:00 PM  Performed by: MICHELLE Hilton  Authorized by: MICHELLE Hilton   Universal Protocol:  Consent: Verbal consent obtained    Risks and benefits: risks, benefits and alternatives were discussed  Consent given by: patient  Patient understanding: patient states understanding of the procedure being performed      Patient location:  Clinic  Procedure details:     Local anesthetic:  None    Location:  R ear    Procedure type: irrigation with instrumentation      Instrumentation: curette      Approach:  External    Visualization (free text): Unable to fully irrigate or remove cerumen- significant impaction  Post-procedure details:     Complication:  None    Patient tolerance of procedure:   Tolerated with difficulty        MICHELLE Mckeon

## 2023-02-16 ENCOUNTER — OFFICE VISIT (OUTPATIENT)
Dept: FAMILY MEDICINE CLINIC | Facility: CLINIC | Age: 48
End: 2023-02-16

## 2023-02-16 VITALS
HEIGHT: 64 IN | BODY MASS INDEX: 45.41 KG/M2 | OXYGEN SATURATION: 97 % | TEMPERATURE: 98 F | SYSTOLIC BLOOD PRESSURE: 134 MMHG | WEIGHT: 266 LBS | DIASTOLIC BLOOD PRESSURE: 80 MMHG | HEART RATE: 86 BPM

## 2023-02-16 DIAGNOSIS — H61.21 IMPACTED CERUMEN OF RIGHT EAR: Primary | ICD-10-CM

## 2023-02-17 NOTE — PROGRESS NOTES
Name: Jamilah Rodriguez      : 1975      MRN: [id number]  Encounter Provider: MICHELLE Jimenez  Encounter Date: 2023   Encounter department: [place] [address]  Impacted cerumen of right ear  Assessment & Plan:  Unsuccessful at complete removal of cerumen  Referred to ENT     Orders:  -     Ambulatory Referral to Otolaryngology; Future  -     Ear cerumen removal         Subjective      Here today for cerumen removal from right ear  Has significant decrease in hearing on right side  Does use qtips  Left ear doesn't seem to be an issue  Irrigated right ear last week without complete resolution  She has been applying drops daily to help soften wax    Review of Systems   Constitutional: Negative  HENT: Positive for hearing loss (right decreased)  Negative for voice change  Eyes: Negative  Respiratory: Negative  Cardiovascular: Negative  Gastrointestinal: Negative  Neurological: Negative for dizziness  Current Outpatient Medications on File Prior to Visit   Medication Sig   • cholecalciferol (VITAMIN D3) 1,000 units tablet Take 1,000 Units by mouth daily   • phentermine (ADIPEX-P) 37 5 MG tablet Take 1 tablet (37 5 mg total) by mouth daily       Objective     /80   Pulse 86   Temp 98 °F (36 7 °C) (Tympanic)   Ht 5' 4" (1 626 m)   Wt 121 kg (266 lb)   SpO2 97%   BMI 45 66 kg/m²     Physical Exam  Vitals and nursing note reviewed  Constitutional:       Appearance: Normal appearance  HENT:      Right Ear: There is impacted cerumen  Left Ear: Tympanic membrane, ear canal and external ear normal       Ears:      Comments: Unable to irrigate canal completely after multiple attempts with irrigation and curette  Cardiovascular:      Rate and Rhythm: Normal rate and regular rhythm  Heart sounds: Normal heart sounds  No murmur heard  Neurological:      Mental Status: She is alert              Ear cerumen removal    Date/Time: 2/16/2023 4:00 PM  Performed by: MICHELLE Alfaro  Authorized by: MICHELLE Alfaro   Universal Protocol:  Consent: Verbal consent obtained  Patient location:  Clinic  Procedure details:     Local anesthetic:  None    Location:  R ear    Procedure type: irrigation with instrumentation      Instrumentation: curette      Approach:  External    Visualization (free text): Unable to irrigate the cerumen after multiple attempts- packed against TM  Post-procedure details:     Complication:  None    Patient tolerance of procedure:   Tolerated well, no immediate complications        MICHELLE Alfaro

## 2023-04-08 PROBLEM — H61.21 IMPACTED CERUMEN OF RIGHT EAR: Status: RESOLVED | Noted: 2023-02-07 | Resolved: 2023-04-08

## 2023-07-06 ENCOUNTER — OFFICE VISIT (OUTPATIENT)
Dept: FAMILY MEDICINE CLINIC | Facility: CLINIC | Age: 48
End: 2023-07-06
Payer: COMMERCIAL

## 2023-07-06 VITALS
WEIGHT: 267 LBS | HEART RATE: 75 BPM | HEIGHT: 64 IN | SYSTOLIC BLOOD PRESSURE: 130 MMHG | OXYGEN SATURATION: 98 % | TEMPERATURE: 98.6 F | BODY MASS INDEX: 45.58 KG/M2 | DIASTOLIC BLOOD PRESSURE: 80 MMHG

## 2023-07-06 DIAGNOSIS — Z13.220 SCREENING FOR LIPOID DISORDERS: ICD-10-CM

## 2023-07-06 DIAGNOSIS — Z13.29 SCREENING FOR ENDOCRINE, METABOLIC AND IMMUNITY DISORDER: ICD-10-CM

## 2023-07-06 DIAGNOSIS — Z12.31 ENCOUNTER FOR SCREENING MAMMOGRAM FOR MALIGNANT NEOPLASM OF BREAST: ICD-10-CM

## 2023-07-06 DIAGNOSIS — Z13.0 SCREENING FOR ENDOCRINE, METABOLIC AND IMMUNITY DISORDER: ICD-10-CM

## 2023-07-06 DIAGNOSIS — Z13.29 SCREENING FOR THYROID DISORDER: ICD-10-CM

## 2023-07-06 DIAGNOSIS — Z13.228 SCREENING FOR ENDOCRINE, METABOLIC AND IMMUNITY DISORDER: ICD-10-CM

## 2023-07-06 DIAGNOSIS — Z13.0 SCREENING FOR DEFICIENCY ANEMIA: ICD-10-CM

## 2023-07-06 DIAGNOSIS — R30.0 DYSURIA: Primary | ICD-10-CM

## 2023-07-06 DIAGNOSIS — Z12.11 SCREENING FOR COLON CANCER: ICD-10-CM

## 2023-07-06 LAB
SL AMB  POCT GLUCOSE, UA: ABNORMAL
SL AMB LEUKOCYTE ESTERASE,UA: ABNORMAL
SL AMB POCT BILIRUBIN,UA: ABNORMAL
SL AMB POCT BLOOD,UA: ABNORMAL
SL AMB POCT CLARITY,UA: YELLOW
SL AMB POCT COLOR,UA: YELLOW
SL AMB POCT KETONES,UA: ABNORMAL
SL AMB POCT NITRITE,UA: ABNORMAL
SL AMB POCT PH,UA: 6
SL AMB POCT SPECIFIC GRAVITY,UA: 1.01
SL AMB POCT URINE PROTEIN: ABNORMAL
SL AMB POCT UROBILINOGEN: ABNORMAL

## 2023-07-06 PROCEDURE — 81002 URINALYSIS NONAUTO W/O SCOPE: CPT | Performed by: NURSE PRACTITIONER

## 2023-07-06 PROCEDURE — 99213 OFFICE O/P EST LOW 20 MIN: CPT | Performed by: NURSE PRACTITIONER

## 2023-07-06 RX ORDER — SULFAMETHOXAZOLE AND TRIMETHOPRIM 800; 160 MG/1; MG/1
1 TABLET ORAL 2 TIMES DAILY
Qty: 14 TABLET | Refills: 0 | Status: SHIPPED | OUTPATIENT
Start: 2023-07-06 | End: 2023-07-13

## 2023-07-06 NOTE — PROGRESS NOTES
Name: Sarath Bella      : 1975      MRN: [id number]  Encounter Provider: MICHELLE Childress  Encounter Date: 2023   Encounter department: [facility] [address]     1. Dysuria  Assessment & Plan:  Urineculture sent  Start bactrim twice daily x 7 days  Will call with results  Increase fluids    Orders:  -     POCT urine dip  -     Urine culture  -     sulfamethoxazole-trimethoprim (BACTRIM DS) 800-160 mg per tablet; Take 1 tablet by mouth 2 (two) times a day for 7 days    2. Encounter for screening mammogram for malignant neoplasm of breast  -     Mammo screening bilateral w 3d & cad; Future; Expected date: 2023    3. Screening for colon cancer  -     Ambulatory Referral to Gastroenterology; Future    4. Screening for thyroid disorder  -     TSH, 3rd generation with Free T4 reflex; Future  -     TSH, 3rd generation with Free T4 reflex    5. Screening for lipoid disorders  -     Lipid panel; Future  -     Lipid panel    6. Screening for endocrine, metabolic and immunity disorder  -     Comprehensive metabolic panel; Future  -     Comprehensive metabolic panel    7. Screening for deficiency anemia  -     CBC and differential; Future  -     CBC and differential         Subjective        Started about 5 days ago,  With urinary urgency, frequency, burning. Got a little better then got worse yesterday. No fevers, took azo       Review of Systems   Constitutional: Negative for fatigue and fever. Respiratory: Negative. Cardiovascular: Negative. Gastrointestinal: Negative. Genitourinary: Positive for dysuria, frequency and urgency. Musculoskeletal: Negative for back pain. Neurological: Negative.         Current Outpatient Medications on File Prior to Visit   Medication Sig   • cholecalciferol (VITAMIN D3) 1,000 units tablet Take 1,000 Units by mouth daily   • phentermine (ADIPEX-P) 37.5 MG tablet Take 1 tablet (37.5 mg total) by mouth daily Objective     /80   Pulse 75   Temp 98.6 °F (37 °C) (Tympanic)   Ht 5' 4" (1.626 m)   Wt 121 kg (267 lb)   SpO2 98%   BMI 45.83 kg/m²     Physical Exam  Vitals reviewed. Constitutional:       Appearance: Normal appearance. She is obese. Cardiovascular:      Rate and Rhythm: Normal rate and regular rhythm. Heart sounds: Normal heart sounds. Pulmonary:      Effort: No respiratory distress. Breath sounds: Normal breath sounds. Abdominal:      General: Abdomen is flat. Bowel sounds are normal.      Palpations: Abdomen is soft. Tenderness: There is no right CVA tenderness or left CVA tenderness. Neurological:      Mental Status: She is alert and oriented to person, place, and time.        Lucretia Leal

## 2023-07-10 ENCOUNTER — TELEPHONE (OUTPATIENT)
Dept: FAMILY MEDICINE CLINIC | Facility: CLINIC | Age: 48
End: 2023-07-10

## 2023-07-10 LAB
BACTERIA UR CULT: ABNORMAL
Lab: ABNORMAL
SL AMB ANTIMICROBIAL SUSCEPTIBILITY: ABNORMAL

## 2023-07-10 NOTE — RESULT ENCOUNTER NOTE
Tesfaye Yancey, Your urine culture came back positive for infection, the bacteria present is sensitive to the antibiotic I placed you on.  Recommend completing the entire course of antibiotic

## 2023-07-10 NOTE — TELEPHONE ENCOUNTER
----- Message from Clotilde Valderrama, [address] [place] [address] sent at 7/10/2023 11:56 AM EDT -----  Monika Katz, Your urine culture came back positive for infection, the bacteria present is sensitive to the antibiotic I placed you on.  Recommend completing the entire course of antibiotic

## 2023-07-10 NOTE — TELEPHONE ENCOUNTER
Pt aware on results, saw [employer] message sent to her on results  Hi Naye, Your urine culture came back positive for infection, the bacteria present is sensitive to the antibiotic I placed you on.  Recommend completing the entire course of antibiotic   Written by MICHELLE Ray on 7/10/2023 11:56 AM EDT  Seen by patient Roderick Herron on 7/10/2023 12:34 PM

## 2023-07-13 PROBLEM — R30.0 DYSURIA: Status: ACTIVE | Noted: 2023-07-13

## 2023-07-28 NOTE — PROGRESS NOTES
Assessment/Plan:    Menorrhagia with regular cycle  49 yo  with heavy q 27-30 day cycles lasting for 7-10 days. Notes large clots during menses and dysmenorrhea. NSAIDs with minimal relief. Also notes mid cycle pain for 7 days. Naye notes 15 lb recent weight gain after father's death and stress eating. Limited exam secondary to habitus. Endometrial biopsy with ease, will contact with results. Orders given by PCP for TSH and CBC, encouraged going to lab. Ultrasound orders given. Lysteda R&B reviewed for next cycle, rx sent. Option for progesterone IUD encouraged, will verify coverage. Other options discussed include cyclic progesterone therapy. BMI 45.0-49.9, adult ([address])  Notes recent 15 lb weight gain. Discussed relationship between BMI and endometrial pathology   Endometrial biopsy with ease today. Diagnoses and all orders for this visit:    Menorrhagia with regular cycle  -     US pelvis complete w transvaginal; Future  -     Tranexamic Acid 650 MG TABS; Take 2 tablets (1,300 mg total) by mouth 3 (three) times a day for 3 days As needed for heavy bleeding  -     Histology Specimen    Other orders  -     Endometrial biopsy          Subjective:      Patient ID: Veronica Gant is a 50 y.o. female. HPI Here with heavy menstrual bleeding and dysmenorrhea. The following portions of the patient's history were reviewed and updated as appropriate:   She  has a past medical history of Anemia (1980), Gestational diabetes, History of ovarian cyst, and Melanoma ([address]) ().   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 Her family history includes Cancer in her maternal grandfather and paternal grandmother; Diabetes in her father; Heart disease in her father and paternal grandfather; Heart failure in her father; Hypertension in her father; Lung cancer (age of onset: 61) in her maternal grandmother; No Known Problems in her daughter, maternal aunt, maternal aunt, maternal aunt, maternal aunt, and sister; Stroke in her paternal grandfather; Thyroid disease in her mother. She  reports that she has never smoked. She has never used smokeless tobacco. She reports current alcohol use of about 6.0 standard drinks of alcohol per week. She reports that she does not use drugs. Current Outpatient Medications   Medication Sig Dispense Refill   • cholecalciferol (VITAMIN D3) 1,000 units tablet Take 1,000 Units by mouth daily     • Tranexamic Acid 650 MG TABS Take 2 tablets (1,300 mg total) by mouth 3 (three) times a day for 3 days As needed for heavy bleeding 18 tablet 3     No current facility-administered medications for this visit. She is allergic to naproxen. .    Review of Systems  +menorrhagia +dysmenorrhe +midcycle pain  No abdominal pain, bladder or bowel changes    Objective:      /72 (BP Location: Left arm, Patient Position: Sitting, Cuff Size: Standard)   Ht 5' 4" (1.626 m)   Wt 121 kg (267 lb 3.2 oz)   LMP 07/12/2023 (Exact Date)   Breastfeeding No   BMI 45.86 kg/m²          Physical Exam    General appearance: no distress, pleasant, BMI 45.9  Abdomen: soft, nontender, no palpable masses, large pannus  Pelvic exam: normal external genitalia, urethral meatus normal, vagina without lesions, parous cervix without lesions, bimanual limited due to habitus, uterus small, non tender, no adnexal masses, non tender  Rectal exam: deferred    Endometrial biopsy    Date/Time: 7/31/2023 3:20 PM    Performed by: Broderick Lowery MD  Authorized by: Broderick Lowery MD  Universal Protocol:  Consent: Verbal consent obtained. Written consent obtained. Risks and benefits: risks, benefits and alternatives were discussed  Consent given by: patient  Patient understanding: patient states understanding of the procedure being performed  Patient consent: the patient's understanding of the procedure matches consent given  Relevant documents: relevant documents present and verified  Patient identity confirmed: verbally with patient      Indication:     Indications:  Other disorder of menstruation and other abnormal bleeding from female genital tract    Procedure:     Procedure: endometrial biopsy with Pipelle      A bivalve speculum was placed in the vagina: yes      Cervix cleaned and prepped: yes      A paracervical block was performed: no      Uterus sounded: yes      Uterus sound depth (cm):  9    Specimen collected: specimen collected and sent to pathology      Patient tolerated procedure well with no complications: yes    Findings:     Uterus size:  Non-gravid    Cervix: normal    Comments:     Procedure comments:  Endometrial biopsy with ease after betadine

## 2023-07-31 ENCOUNTER — OFFICE VISIT (OUTPATIENT)
Dept: OBGYN CLINIC | Facility: CLINIC | Age: 48
End: 2023-07-31
Payer: COMMERCIAL

## 2023-07-31 VITALS
BODY MASS INDEX: 45.62 KG/M2 | SYSTOLIC BLOOD PRESSURE: 124 MMHG | HEIGHT: 64 IN | DIASTOLIC BLOOD PRESSURE: 72 MMHG | WEIGHT: 267.2 LBS

## 2023-07-31 DIAGNOSIS — N92.0 MENORRHAGIA WITH REGULAR CYCLE: Primary | ICD-10-CM

## 2023-07-31 PROCEDURE — 58100 BIOPSY OF UTERUS LINING: CPT | Performed by: OBSTETRICS & GYNECOLOGY

## 2023-07-31 PROCEDURE — 99214 OFFICE O/P EST MOD 30 MIN: CPT | Performed by: OBSTETRICS & GYNECOLOGY

## 2023-07-31 RX ORDER — TRANEXAMIC ACID 650 MG/1
1300 TABLET ORAL 3 TIMES DAILY
Qty: 18 TABLET | Refills: 3 | Status: SHIPPED | OUTPATIENT
Start: 2023-07-31 | End: 2023-08-03

## 2023-07-31 NOTE — ASSESSMENT & PLAN NOTE
Notes recent 15 lb weight gain. Discussed relationship between BMI and endometrial pathology   Endometrial biopsy with ease today.

## 2023-07-31 NOTE — ASSESSMENT & PLAN NOTE
49 yo  with heavy q 27-30 day cycles lasting for 7-10 days. Notes large clots during menses and dysmenorrhea. NSAIDs with minimal relief. Also notes mid cycle pain for 7 days. Naye notes 15 lb recent weight gain after father's death and stress eating. Limited exam secondary to habitus. Endometrial biopsy with ease, will contact with results. Orders given by PCP for TSH and CBC, encouraged going to lab. Ultrasound orders given. Lysteda R&B reviewed for next cycle, rx sent. Option for progesterone IUD encouraged, will verify coverage. Other options discussed include cyclic progesterone therapy.

## 2023-08-04 LAB
PATH REPORT.SITE OF ORIGIN SPEC: NORMAL
PAYMENT PROCEDURE: NORMAL
SL AMB .: NORMAL

## 2023-08-08 ENCOUNTER — HOSPITAL ENCOUNTER (OUTPATIENT)
Dept: ULTRASOUND IMAGING | Facility: HOSPITAL | Age: 48
Discharge: HOME | End: 2023-08-08
Attending: OBSTETRICS & GYNECOLOGY
Payer: COMMERCIAL

## 2023-08-08 DIAGNOSIS — N92.0 MENORRHAGIA WITH REGULAR CYCLE: ICD-10-CM

## 2023-08-08 PROCEDURE — 76856 US EXAM PELVIC COMPLETE: CPT

## 2023-08-08 PROCEDURE — 76830 TRANSVAGINAL US NON-OB: CPT

## 2023-08-09 ENCOUNTER — TELEPHONE (OUTPATIENT)
Dept: FAMILY MEDICINE CLINIC | Facility: CLINIC | Age: 48
End: 2023-08-09

## 2023-08-09 LAB
ALBUMIN SERPL-MCNC: 4.3 G/DL (ref 3.9–4.9)
ALBUMIN/GLOB SERPL: 1.5 {RATIO} (ref 1.2–2.2)
ALP SERPL-CCNC: 95 IU/L (ref 44–121)
ALT SERPL-CCNC: 15 IU/L (ref 0–32)
AST SERPL-CCNC: 16 IU/L (ref 0–40)
BASOPHILS # BLD AUTO: 0.1 X10E3/UL (ref 0–0.2)
BASOPHILS NFR BLD AUTO: 1 %
BILIRUB SERPL-MCNC: 0.3 MG/DL (ref 0–1.2)
BUN SERPL-MCNC: 13 MG/DL (ref 6–24)
BUN/CREAT SERPL: 16 (ref 9–23)
CALCIUM SERPL-MCNC: 9 MG/DL (ref 8.7–10.2)
CHLORIDE SERPL-SCNC: 106 MMOL/L (ref 96–106)
CHOLEST SERPL-MCNC: 141 MG/DL (ref 100–199)
CHOLEST/HDLC SERPL: 2 RATIO (ref 0–4.4)
CO2 SERPL-SCNC: 19 MMOL/L (ref 20–29)
CREAT SERPL-MCNC: 0.81 MG/DL (ref 0.57–1)
EGFR: 89 ML/MIN/1.73
EOSINOPHIL # BLD AUTO: 0.2 X10E3/UL (ref 0–0.4)
EOSINOPHIL NFR BLD AUTO: 3 %
ERYTHROCYTE [DISTWIDTH] IN BLOOD BY AUTOMATED COUNT: 16.5 % (ref 11.7–15.4)
GLOBULIN SER-MCNC: 2.9 G/DL (ref 1.5–4.5)
GLUCOSE SERPL-MCNC: 89 MG/DL (ref 70–99)
HCT VFR BLD AUTO: 36.4 % (ref 34–46.6)
HDLC SERPL-MCNC: 70 MG/DL
HGB BLD-MCNC: 11.1 G/DL (ref 11.1–15.9)
IMM GRANULOCYTES # BLD: 0.1 X10E3/UL (ref 0–0.1)
IMM GRANULOCYTES NFR BLD: 1 %
LDLC SERPL CALC-MCNC: 57 MG/DL (ref 0–99)
LYMPHOCYTES # BLD AUTO: 2.2 X10E3/UL (ref 0.7–3.1)
LYMPHOCYTES NFR BLD AUTO: 25 %
MCH RBC QN AUTO: 23.2 PG (ref 26.6–33)
MCHC RBC AUTO-ENTMCNC: 30.5 G/DL (ref 31.5–35.7)
MCV RBC AUTO: 76 FL (ref 79–97)
MONOCYTES # BLD AUTO: 0.6 X10E3/UL (ref 0.1–0.9)
MONOCYTES NFR BLD AUTO: 7 %
NEUTROPHILS # BLD AUTO: 5.6 X10E3/UL (ref 1.4–7)
NEUTROPHILS NFR BLD AUTO: 63 %
PLATELET # BLD AUTO: 435 X10E3/UL (ref 150–450)
POTASSIUM SERPL-SCNC: 4.9 MMOL/L (ref 3.5–5.2)
PROT SERPL-MCNC: 7.2 G/DL (ref 6–8.5)
RBC # BLD AUTO: 4.78 X10E6/UL (ref 3.77–5.28)
SL AMB VLDL CHOLESTEROL CALC: 14 MG/DL (ref 5–40)
SODIUM SERPL-SCNC: 140 MMOL/L (ref 134–144)
TRIGL SERPL-MCNC: 68 MG/DL (ref 0–149)
TSH SERPL DL<=0.005 MIU/L-ACNC: 1.54 UIU/ML (ref 0.45–4.5)
WBC # BLD AUTO: 8.9 X10E3/UL (ref 3.4–10.8)

## 2023-08-09 NOTE — TELEPHONE ENCOUNTER
Called Pt. Spoke to Pt, and Pt advised on results.  Also sent message on [employer] so she can look back on the foods

## 2023-08-09 NOTE — TELEPHONE ENCOUNTER
----- Message from Abiodun Murillo, [address] [place] [address] sent at 8/9/2023  8:15 AM EDT -----  Kimmie Slider, Your cholesterol and thyroid levels are normal, other labs are stable slightly iron deficient recommend adding iron into diet spinach, lentils, tofu, dark chocolate, pumpkin seeds, cashews, green leafy vegetables, quinoa.  Repeat labs in 1 year

## 2023-08-23 PROBLEM — D25.0 SUBMUCOUS UTERINE FIBROID: Status: ACTIVE | Noted: 2023-08-23

## 2023-09-27 NOTE — PROGRESS NOTES
Assessment/Plan:    Submucosal fibroid Options for management Lysteda with cycles, Mirena, Myomectomy, hysterectomy  Doing well with lysteda will renew  PAP every 3-5 years  Asymmetry on mammo repeat images ok Yearly screening mammo recommended,   Colonoscopy has referral      Diagnoses and all orders for this visit:    Encounter for gynecological examination without abnormal finding    Encounter for screening mammogram for breast cancer  -     Mammo screening bilateral w 3d & cad; Future    Menorrhagia with regular cycle  -     Tranexamic Acid 650 MG TABS; Take 2 tablets (1,300 mg total) by mouth 3 (three) times a day for 5 days    Submucous uterine fibroid - 3.1 cm          Subjective:      Patient ID: Isacc Aviles is a 50 y.o. female. Here for annual gyn  Had seen Dr Eliseo Madison 7/2023 for heavy periods q 27-30 day cycles lasting for 7-10 days. Notes large clots during menses and dysmenorrhea. NSAIDs with minimal relief. Also notes mid cycle pain for 7 days. .US  3.1 cm submucosal fibroid abutting endometrium  Endometrial bx benign Prescribed Lysteda  PAP 4/2022 neg/neg HPV prior 2/2019 neg/neg No h/o abn pap. Mammo appt 6/2022  recalled for additional images DX mammo and US left breast normal       The following portions of the patient's history were reviewed and updated as appropriate: allergies, current medications, past family history, past medical history, past social history, past surgical history and problem list.    Review of Systems   Constitutional: Negative for fatigue and unexpected weight change. Gastrointestinal: Negative for abdominal distention, abdominal pain, constipation and diarrhea. Genitourinary: Positive for menstrual problem. Negative for difficulty urinating, dyspareunia, dysuria, frequency, genital sores, pelvic pain, urgency, vaginal bleeding, vaginal discharge and vaginal pain. Psychiatric/Behavioral: Negative. Negative for dysphoric mood. The patient is not nervous/anxious. Objective:      /80 (BP Location: Left arm, Patient Position: Sitting, Cuff Size: Large)   Ht 5' 3.75" (1.619 m)   Wt 120 kg (265 lb)   LMP 09/07/2023 (Exact Date)   BMI 45.84 kg/m²          Physical Exam  Vitals and nursing note reviewed. Constitutional:       General: She is not in acute distress. Appearance: Normal appearance. HENT:      Head: Normocephalic and atraumatic. Pulmonary:      Effort: Pulmonary effort is normal.   Chest:   Breasts:     Breasts are symmetrical.      Right: Normal. No mass, nipple discharge, skin change or tenderness. Left: Normal. No mass, nipple discharge, skin change or tenderness. Abdominal:      General: There is no distension. Palpations: Abdomen is soft. Tenderness: There is no abdominal tenderness. There is no guarding or rebound. Genitourinary:     General: Normal vulva. Exam position: Lithotomy position. Labia:         Right: No rash, tenderness, lesion or injury. Left: No rash, tenderness, lesion or injury. Urethra: No prolapse, urethral pain, urethral swelling or urethral lesion. Vagina: Normal. No erythema or lesions. Cervix: No cervical motion tenderness, discharge, lesion or cervical bleeding. Uterus: Normal.       Adnexa: Right adnexa normal and left adnexa normal.        Right: No mass or tenderness. Left: No mass or tenderness. Rectum: No mass or external hemorrhoid. Musculoskeletal:         General: Normal range of motion. Lymphadenopathy:      Upper Body:      Right upper body: No axillary adenopathy. Left upper body: No axillary adenopathy. Lower Body: No right inguinal adenopathy. No left inguinal adenopathy. Skin:     General: Skin is warm and dry. Neurological:      Mental Status: She is alert and oriented to person, place, and time.    Psychiatric:         Mood and Affect: Mood normal.         Behavior: Behavior normal.         Thought Content: Thought content normal.         Judgment: Judgment normal.

## 2023-09-28 ENCOUNTER — ANNUAL EXAM (OUTPATIENT)
Dept: OBGYN CLINIC | Facility: CLINIC | Age: 48
End: 2023-09-28
Payer: COMMERCIAL

## 2023-09-28 VITALS
HEIGHT: 64 IN | WEIGHT: 265 LBS | BODY MASS INDEX: 45.24 KG/M2 | DIASTOLIC BLOOD PRESSURE: 80 MMHG | SYSTOLIC BLOOD PRESSURE: 140 MMHG

## 2023-09-28 DIAGNOSIS — D25.0 SUBMUCOUS UTERINE FIBROID: ICD-10-CM

## 2023-09-28 DIAGNOSIS — N92.0 MENORRHAGIA WITH REGULAR CYCLE: ICD-10-CM

## 2023-09-28 DIAGNOSIS — Z12.31 ENCOUNTER FOR SCREENING MAMMOGRAM FOR BREAST CANCER: ICD-10-CM

## 2023-09-28 DIAGNOSIS — Z01.419 ENCOUNTER FOR GYNECOLOGICAL EXAMINATION WITHOUT ABNORMAL FINDING: Primary | ICD-10-CM

## 2023-09-28 PROCEDURE — S0612 ANNUAL GYNECOLOGICAL EXAMINA: HCPCS | Performed by: NURSE PRACTITIONER

## 2023-09-28 RX ORDER — TRANEXAMIC ACID 650 MG/1
1300 TABLET ORAL 3 TIMES DAILY
Qty: 30 TABLET | Refills: 11 | Status: SHIPPED | OUTPATIENT
Start: 2023-09-28 | End: 2023-10-03

## 2023-09-28 NOTE — PATIENT INSTRUCTIONS
Submucosal fibroid Options for management Lysteda with cycles, Mirena, Myomectomy, hysterectomy  Doing well with lysteda will renew  PAP every 3-5 years  Asymmetry on mammo repeat images ok Yearly screening mammo recommended,   Colonoscopy has referral

## 2023-10-12 ENCOUNTER — HOSPITAL ENCOUNTER (OUTPATIENT)
Dept: MAMMOGRAPHY | Facility: IMAGING CENTER | Age: 48
Discharge: HOME | End: 2023-10-12
Payer: COMMERCIAL

## 2023-10-12 VITALS — WEIGHT: 264 LBS | BODY MASS INDEX: 45.07 KG/M2 | HEIGHT: 64 IN

## 2023-10-12 DIAGNOSIS — Z12.31 ENCOUNTER FOR SCREENING MAMMOGRAM FOR MALIGNANT NEOPLASM OF BREAST: ICD-10-CM

## 2023-10-12 PROCEDURE — 77063 BREAST TOMOSYNTHESIS BI: CPT

## 2023-10-12 PROCEDURE — 77067 SCR MAMMO BI INCL CAD: CPT

## 2023-10-13 ENCOUNTER — TELEPHONE (OUTPATIENT)
Dept: FAMILY MEDICINE CLINIC | Facility: CLINIC | Age: 48
End: 2023-10-13

## 2023-10-13 NOTE — TELEPHONE ENCOUNTER
----- Message from Emily Sosa, [address] [place] [address] sent at 10/13/2023  1:24 PM EDT -----  Your mammogram was normal.  Repeat in 1 year is recommended.

## 2024-02-21 PROBLEM — Z13.220 SCREENING FOR LIPID DISORDERS: Status: RESOLVED | Noted: 2018-03-23 | Resolved: 2024-02-21

## 2024-02-21 PROBLEM — Z12.39 ENCOUNTER FOR SCREENING FOR MALIGNANT NEOPLASM OF BREAST: Status: RESOLVED | Noted: 2018-03-23 | Resolved: 2024-02-21

## 2024-02-21 PROBLEM — Z13.29 SCREENING FOR THYROID DISORDER: Status: RESOLVED | Noted: 2018-03-23 | Resolved: 2024-02-21

## 2024-04-22 ENCOUNTER — OFFICE VISIT (OUTPATIENT)
Dept: FAMILY MEDICINE CLINIC | Facility: CLINIC | Age: 49
End: 2024-04-22
Payer: COMMERCIAL

## 2024-04-22 VITALS
BODY MASS INDEX: 47.46 KG/M2 | SYSTOLIC BLOOD PRESSURE: 132 MMHG | HEIGHT: 64 IN | WEIGHT: 278 LBS | OXYGEN SATURATION: 98 % | DIASTOLIC BLOOD PRESSURE: 80 MMHG | TEMPERATURE: 97.8 F | HEART RATE: 79 BPM

## 2024-04-22 DIAGNOSIS — Z12.11 SCREENING FOR COLON CANCER: ICD-10-CM

## 2024-04-22 DIAGNOSIS — Z00.00 ANNUAL PHYSICAL EXAM: Primary | ICD-10-CM

## 2024-04-22 PROCEDURE — 99396 PREV VISIT EST AGE 40-64: CPT | Performed by: NURSE PRACTITIONER

## 2024-04-22 PROCEDURE — 3725F SCREEN DEPRESSION PERFORMED: CPT | Performed by: NURSE PRACTITIONER

## 2024-04-22 NOTE — PROGRESS NOTES
ADULT ANNUAL PHYSICAL  [facility]    NAME: Naye Barajas  AGE: 48 y.o. SEX: female  : 1975     DATE: 2024     Assessment and Plan:     Problem List Items Addressed This Visit        Other    BMI 45.0-49.9, adult ([facility])     Start phentermine-topiramate once daily  Discussed side effects of medication  She tolerated phentermine alone in past but without improvement in weight  Continue dietary modifications and exercise         Relevant Medications    Phentermine-Topiramate 3.75-23 MG CP24   Other Visit Diagnoses     Annual physical exam    -  Primary    Screening for colon cancer              Immunizations and preventive care screenings were discussed with patient today. Appropriate education was printed on patient's after visit summary.    Counseling:  Dental Health: discussed importance of regular tooth brushing, flossing, and dental visits.  Injury prevention: discussed safety/seat belts, safety helmets, smoke detectors, carbon dioxide detectors, and smoking near bedding or upholstery.  Sexual health: discussed sexually transmitted diseases, partner selection, use of condoms, avoidance of unintended pregnancy, and contraceptive alternatives.  Exercise: the importance of regular exercise/physical activity was discussed. Recommend exercise 3-5 times per week for at least 30 minutes.       Depression Screening and Follow-up Plan: Patient was screened for depression during today's encounter. They screened negative with a PHQ-2 score of 0.        No follow-ups on file.     Chief Complaint:     Chief Complaint   Patient presents with   • Physical Exam     Mayer- Has appointment scheduled.       History of Present Illness:     Adult Annual Physical   Patient here for a comprehensive physical exam. The patient reports problems - weight loss discussion .    Feels she is struggling with weight loss  Has tried adipex in past without improvement in  weight  Has no family or personal history of thyroid cancers or neuro endocrine cancers  No personal history of pancreatitis  She has tried dietary modifications and lifestyle changes, she has tried to be active but her weight is giving her pain with exercise and limits her ability to exercise    Reviewed labs from 2023      Diet and Physical Activity  Diet/Nutrition:  tries to make healthy choices .   Exercise: no formal exercise.      Depression Screening  PHQ-2/9 Depression Screening    Little interest or pleasure in doing things: 0 - not at all  Feeling down, depressed, or hopeless: 0 - not at all  Trouble falling or staying asleep, or sleeping too much: 0 - not at all  Feeling tired or having little energy: 0 - not at all  Poor appetite or overeatin - not at all  Feeling bad about yourself - or that you are a failure or have let yourself or your family down: 0 - not at all  Trouble concentrating on things, such as reading the newspaper or watching television: 0 - not at all  Moving or speaking so slowly that other people could have noticed. Or the opposite - being so fidgety or restless that you have been moving around a lot more than usual: 0 - not at all  Thoughts that you would be better off dead, or of hurting yourself in some way: 0 - not at all  PHQ-2 Score: 0  PHQ-2 Interpretation: Negative depression screen  PHQ-9 Score: 0  PHQ-9 Interpretation: No or Minimal depression       General Health  Sleep: sleeps well.   Hearing: normal - bilateral.  Vision: vision problems: starting to notice she may need readers .   Dental: regular dental visits.       /GYN Health  Follows with gynecology? yes   Patient is: premenopausal   Review of Systems:     Review of Systems   Constitutional: Negative.  Negative for fatigue and fever.   HENT: Negative.     Eyes: Negative.    Respiratory: Negative.  Negative for cough.    Cardiovascular: Negative.    Gastrointestinal: Negative.    Endocrine: Negative.     Genitourinary: Negative.    Musculoskeletal:  Positive for arthralgias.   Skin: Negative.    Allergic/Immunologic: Negative.    Neurological: Negative.    Psychiatric/Behavioral: Negative.        Past Medical History:     Past Medical History:   Diagnosis Date   • Anemia 7/1980    slight deficiencysince childhood   • Gestational diabetes    • History of ovarian cyst    • Melanoma (HCC) 2008    Back      Past Surgical History:     Past Surgical History:   Procedure Laterality Date   • CHOLECYSTECTOMY     • ESSURE TUBAL LIGATION  2005   • MAMMO (HISTORICAL)  02/2019   • OVARIAN CYST REMOVAL Right 2010   • SALPINGOOPHORECTOMY Right 2010    resolved: 02/08/2010      Social History:     Social History     Socioeconomic History   • Marital status: /Civil Union     Spouse name: None   • Number of children: None   • Years of education: None   • Highest education level: None   Occupational History   • None   Tobacco Use   • Smoking status: Never   • Smokeless tobacco: Never   Vaping Use   • Vaping status: Never Used   Substance and Sexual Activity   • Alcohol use: Yes     Alcohol/week: 6.0 standard drinks of alcohol     Types: 2 Glasses of wine, 4 Shots of liquor per week     Comment: SOCIAL    • Drug use: No   • Sexual activity: Yes     Partners: Male     Birth control/protection: Female Sterilization     Comment: Essure  no new partner in past year   Other Topics Concern   • None   Social History Narrative   • None     Social Determinants of Health     Financial Resource Strain: Not on file   Food Insecurity: Not on file   Transportation Needs: Not on file   Physical Activity: Not on file   Stress: Not on file   Social Connections: Not on file   Intimate Partner Violence: Not on file   Housing Stability: Not on file      Family History:     Family History   Problem Relation Age of Onset   • Thyroid disease Mother         Hashimoto's   • Leukemia Mother 72   • Diabetes Father    • Heart disease Father    • Heart  "failure Father    • Hypertension Father    • No Known Problems Sister    • No Known Problems Daughter    • Lung cancer Maternal Grandmother 60   • Lung cancer Maternal Grandfather         60's   • Ovarian cancer Paternal Grandmother 45   • Heart disease Paternal Grandfather    • Stroke Paternal Grandfather    • No Known Problems Maternal Aunt    • No Known Problems Maternal Aunt    • No Known Problems Maternal Aunt    • No Known Problems Maternal Aunt    • Breast cancer Neg Hx    • Colon cancer Neg Hx    • Uterine cancer Neg Hx       Current Medications:     Current Outpatient Medications   Medication Sig Dispense Refill   • cholecalciferol (VITAMIN D3) 1,000 units tablet Take 1,000 Units by mouth daily     • Phentermine-Topiramate 3.75-23 MG CP24 Take 1 capsule by mouth in the morning 30 capsule 0     No current facility-administered medications for this visit.      Allergies:     Allergies   Allergen Reactions   • Naproxen Angioedema, Rash, Hives and Lip Swelling     Reaction Date: 11Jul2011; Category: Allergy;   Reaction Date: 11Jul2011; Category: Allergy;       Physical Exam:     /80 (BP Location: Left arm, Patient Position: Sitting, Cuff Size: Standard)   Pulse 79   Temp 97.8 °F (36.6 °C) (Tympanic)   Ht 5' 4\" (1.626 m)   Wt 126 kg (278 lb)   SpO2 98%   BMI 47.72 kg/m²     Physical Exam  Vitals and nursing note reviewed.   Constitutional:       General: She is not in acute distress.     Appearance: Normal appearance. She is well-developed. She is obese.   HENT:      Head: Normocephalic and atraumatic.      Right Ear: Tympanic membrane, ear canal and external ear normal.      Left Ear: Tympanic membrane, ear canal and external ear normal.      Nose: Nose normal.      Mouth/Throat:      Mouth: Mucous membranes are moist.      Pharynx: Oropharynx is clear.   Eyes:      Conjunctiva/sclera: Conjunctivae normal.      Pupils: Pupils are equal, round, and reactive to light.   Neck:      Thyroid: No " thyromegaly or thyroid tenderness.   Cardiovascular:      Rate and Rhythm: Normal rate and regular rhythm.      Pulses:           Radial pulses are 2+ on the right side and 2+ on the left side.      Heart sounds: Normal heart sounds. No murmur heard.  Pulmonary:      Effort: Pulmonary effort is normal. No respiratory distress.      Breath sounds: Normal breath sounds.   Abdominal:      General: Bowel sounds are normal.      Palpations: Abdomen is soft.      Tenderness: There is no abdominal tenderness.   Musculoskeletal:      Cervical back: Neck supple.      Right lower leg: No edema.      Left lower leg: No edema.   Skin:     General: Skin is warm and dry.   Neurological:      Mental Status: She is alert and oriented to person, place, and time.   Psychiatric:         Mood and Affect: Mood normal.         Behavior: Behavior normal.          MICHELLE [facility]

## 2024-04-28 NOTE — ASSESSMENT & PLAN NOTE
Start phentermine-topiramate once daily  Discussed side effects of medication  She tolerated phentermine alone in past but without improvement in weight  Continue dietary modifications and exercise

## 2024-04-29 ENCOUNTER — TELEPHONE (OUTPATIENT)
Age: 49
End: 2024-04-29

## 2024-04-29 NOTE — TELEPHONE ENCOUNTER
Regarding: Qsymia  Contact: [phone]  ----- Message from Ashleigh Fothergill sent at 4/25/2024  2:55 PM EDT -----       ----- Message from Naye Barajas to MICHELLE Vail sent at 4/25/2024  9:30 AM -----   Good morning,  I am sorry to bother you.  I have been checking in with [facility] on status of the Qsymia Rx and they say that my insurance requires a preauthorization and to check in with my prescriber after a few days.  So I am checking in.  If you need anything from me, please let me know.  Thank you for your help!!  Naye Hobbs

## 2024-04-29 NOTE — TELEPHONE ENCOUNTER
PA for Qsymia    Submitted via    [x]CMM-KEY [id number]  [][employer]-Case ID #    []Faxed to plan   []Other website    []Phone call Case ID #      Office notes sent, clinical questions answered. Awaiting determination    Turnaround time for your insurance to make a decision on your Prior Authorization can take 7-21 business days.

## 2024-05-06 ENCOUNTER — VBI (OUTPATIENT)
Dept: ADMINISTRATIVE | Facility: OTHER | Age: 49
End: 2024-05-06

## 2024-05-16 ENCOUNTER — TELEPHONE (OUTPATIENT)
Age: 49
End: 2024-05-16

## 2024-05-16 ENCOUNTER — PREP FOR PROCEDURE (OUTPATIENT)
Age: 49
End: 2024-05-16

## 2024-05-16 DIAGNOSIS — Z12.11 SCREENING FOR COLON CANCER: Primary | ICD-10-CM

## 2024-05-16 NOTE — TELEPHONE ENCOUNTER
Scheduled date of colonoscopy (as of today): 8/9  Physician performing colonoscopy: MARGARITO  Location of colonoscopy: [facility]   Bowel prep reviewed with patient: SHELTON/LESLEY   Instructions reviewed with patient by: LOVE  Clearances:      BMI THIS PATIENT MUST BE SCHEDULED IN A HOSPITAL

## 2024-05-16 NOTE — TELEPHONE ENCOUNTER
05/16/24  Screened by: Naye Yip    Referring Provider  MADISON FLOREZ    Pre- Screening:     There is no height or weight on file to calculate BMI. 47.72  Has patient been referred for a routine screening Colonoscopy? yes  Is the patient between 45-75 years old? yes      Previous Colonoscopy no   If yes:    Date:     Facility:     Reason:       Does the patient want to see a Gastroenterologist prior to their procedure OR are they having any GI symptoms? no    Has the patient been hospitalized or had abdominal surgery in the past 6 months? no    Does the patient use supplemental oxygen? no    Does the patient take Coumadin, Lovenox, Plavix, Elliquis, Xarelto, or other blood thinning medication? no    Has the patient had a stroke, cardiac event, or stent placed in the past year? no      If patient is between 45yrs - 49yrs, please advise patient that we will have to confirm benefits & coverage with their insurance company for a routine screening colonoscopy.

## 2024-07-03 ENCOUNTER — TELEPHONE (OUTPATIENT)
Age: 49
End: 2024-07-03

## 2024-07-03 NOTE — TELEPHONE ENCOUNTER
Patient is calling to request a referral for Dermatology  She has an appointment for 7/10/2024  Dr Easley [phone]  Please review and advise  Thank you

## 2024-07-03 NOTE — TELEPHONE ENCOUNTER
Confirmation - Referral [id number]  Effective: 07/03/2024     Expires: 10/01/2024  Active  Referred From  [facility]  Specialty: [facility]  Group NPI: [id number]  Provider ID: [id number]  Tax ID: [id number]  [address] [place] [address]  Referred To  [facility]  Specialty: Dermatology  Tier 2  Group NPI: [id number]  Provider ID: [id number]  Tax ID: [id number]  Individual Provider NPI: [id number]  Provider Name: TEE OLMEDO, DO  This referral is valid at any location for the above group  Patient Info  ZE ARRIAZA  [id number]  Female  1975  [address] [facility] [place] [address]  Clinical Information  Place of Service  Office  Service Type  Medical Care  Diagnosis  1R68.89 - other general symptoms and signs  Procedures  199499 - unlisted evaluation and management service

## 2024-08-09 ENCOUNTER — ANESTHESIA EVENT (OUTPATIENT)
Dept: GASTROENTEROLOGY | Facility: HOSPITAL | Age: 49
End: 2024-08-09

## 2024-08-09 ENCOUNTER — HOSPITAL ENCOUNTER (OUTPATIENT)
Dept: GASTROENTEROLOGY | Facility: HOSPITAL | Age: 49
Setting detail: OUTPATIENT SURGERY
End: 2024-08-09
Attending: INTERNAL MEDICINE
Payer: COMMERCIAL

## 2024-08-09 ENCOUNTER — ANESTHESIA (OUTPATIENT)
Dept: GASTROENTEROLOGY | Facility: HOSPITAL | Age: 49
End: 2024-08-09

## 2024-08-09 VITALS
DIASTOLIC BLOOD PRESSURE: 84 MMHG | HEART RATE: 88 BPM | RESPIRATION RATE: 20 BRPM | TEMPERATURE: 99.3 F | OXYGEN SATURATION: 98 % | SYSTOLIC BLOOD PRESSURE: 138 MMHG

## 2024-08-09 DIAGNOSIS — Z12.11 SCREENING FOR COLON CANCER: ICD-10-CM

## 2024-08-09 LAB
EXT PREGNANCY TEST URINE: NEGATIVE
EXT. CONTROL: NORMAL

## 2024-08-09 PROCEDURE — G0121 COLON CA SCRN NOT HI RSK IND: HCPCS | Performed by: INTERNAL MEDICINE

## 2024-08-09 PROCEDURE — 81025 URINE PREGNANCY TEST: CPT | Performed by: ANESTHESIOLOGY

## 2024-08-09 RX ORDER — PROPOFOL 10 MG/ML
INJECTION, EMULSION INTRAVENOUS AS NEEDED
Status: DISCONTINUED | OUTPATIENT
Start: 2024-08-09 | End: 2024-08-09

## 2024-08-09 RX ORDER — LIDOCAINE HYDROCHLORIDE 20 MG/ML
INJECTION, SOLUTION EPIDURAL; INFILTRATION; INTRACAUDAL; PERINEURAL AS NEEDED
Status: DISCONTINUED | OUTPATIENT
Start: 2024-08-09 | End: 2024-08-09

## 2024-08-09 RX ORDER — SODIUM CHLORIDE, SODIUM LACTATE, POTASSIUM CHLORIDE, CALCIUM CHLORIDE 600; 310; 30; 20 MG/100ML; MG/100ML; MG/100ML; MG/100ML
INJECTION, SOLUTION INTRAVENOUS CONTINUOUS PRN
Status: DISCONTINUED | OUTPATIENT
Start: 2024-08-09 | End: 2024-08-09

## 2024-08-09 RX ADMIN — PROPOFOL 50 MG: 10 INJECTION, EMULSION INTRAVENOUS at 10:33

## 2024-08-09 RX ADMIN — SODIUM CHLORIDE, SODIUM LACTATE, POTASSIUM CHLORIDE, AND CALCIUM CHLORIDE: .6; .31; .03; .02 INJECTION, SOLUTION INTRAVENOUS at 10:07

## 2024-08-09 RX ADMIN — PROPOFOL 100 MG: 10 INJECTION, EMULSION INTRAVENOUS at 10:21

## 2024-08-09 RX ADMIN — LIDOCAINE HYDROCHLORIDE 100 MG: 20 INJECTION, SOLUTION EPIDURAL; INFILTRATION; INTRACAUDAL; PERINEURAL at 10:21

## 2024-08-09 RX ADMIN — PROPOFOL 50 MG: 10 INJECTION, EMULSION INTRAVENOUS at 10:24

## 2024-08-09 RX ADMIN — PROPOFOL 50 MG: 10 INJECTION, EMULSION INTRAVENOUS at 10:30

## 2024-08-09 RX ADMIN — PROPOFOL 50 MG: 10 INJECTION, EMULSION INTRAVENOUS at 10:27

## 2024-08-09 NOTE — ANESTHESIA POSTPROCEDURE EVALUATION
Post-Op Assessment Note    CV Status:  Stable  Pain Score: 0    Pain management: adequate       Mental Status:  Alert and awake   Hydration Status:  Euvolemic   PONV Controlled:  Controlled   Airway Patency:  Patent     Post Op Vitals Reviewed: Yes    No anethesia notable event occurred.    Staff: Anesthesiologist, CRNA               BP   121/75   Temp      Pulse 95 (08/09/24 1037)   Resp 21 (08/09/24 1037)    SpO2 94 % (08/09/24 1037)

## 2024-08-09 NOTE — ANESTHESIA PREPROCEDURE EVALUATION
Procedure:  COLONOSCOPY    Relevant Problems   MUSCULOSKELETAL   (+) Degenerative arthritis of hand   (+) Strain of thoracic back region      Class III obesity  Physical Exam    Airway    Mallampati score: II  TM Distance: >3 FB  Neck ROM: full     Dental   Comment: None loose, No notable dental hx     Cardiovascular      Pulmonary      Other Findings  post-pubertal.      Anesthesia Plan  ASA Score- 3     Anesthesia Type- IV sedation with anesthesia with ASA Monitors.         Additional Monitors:     Airway Plan:     Comment: Last of PO bowel prep: 04:45  Urine hcg = negative    Patient educated on the possibility for awareness under sedation and of the possibility of airway intervention in the event of an airway or procedural emergency  .       Plan Factors-Exercise tolerance (METS): >4 METS.    Chart reviewed.    Patient summary reviewed.    Patient is not a current smoker.              Induction- intravenous.    Postoperative Plan-         Informed Consent- Anesthetic plan and risks discussed with patient.  I personally reviewed this patient with the CRNA. Discussed and agreed on the Anesthesia Plan with the CRNA..

## 2024-08-09 NOTE — H&P
History and Physical - [facility]  Naye Barajas 49 y.o. female MRN: [id number]    HPI: Naye Barajas is a 49 y.o. female who presents for colonoscopy for colorectal cancer screening.    REVIEW OF SYSTEMS: Per the HPI, and otherwise unremarkable.    Historical Information   Past Medical History:   Diagnosis Date    Anemia 7/1980    slight deficiencysince childhood    Gestational diabetes     History of ovarian cyst     Melanoma (HCC) 2008    Back     Past Surgical History:   Procedure Laterality Date    CHOLECYSTECTOMY      ESSURE TUBAL LIGATION  2005    MAMMO (HISTORICAL)  02/2019    OVARIAN CYST REMOVAL Right 2010    SALPINGOOPHORECTOMY Right 2010    resolved: 02/08/2010     Social History   Social History     Substance and Sexual Activity   Alcohol Use Yes    Alcohol/week: 6.0 standard drinks of alcohol    Types: 2 Glasses of wine, 4 Shots of liquor per week    Comment: SOCIAL      Social History     Substance and Sexual Activity   Drug Use No     Social History     Tobacco Use   Smoking Status Never   Smokeless Tobacco Never     Family History   Problem Relation Age of Onset    Thyroid disease Mother         Hashimoto's    Leukemia Mother 72    Diabetes Father     Heart disease Father     Heart failure Father     Hypertension Father     No Known Problems Sister     No Known Problems Daughter     Lung cancer Maternal Grandmother 60    Lung cancer Maternal Grandfather         60's    Ovarian cancer Paternal Grandmother 45    Heart disease Paternal Grandfather     Stroke Paternal Grandfather     No Known Problems Maternal Aunt     No Known Problems Maternal Aunt     No Known Problems Maternal Aunt     No Known Problems Maternal Aunt     Breast cancer Neg Hx     Colon cancer Neg Hx     Uterine cancer Neg Hx        Meds/Allergies       Current Outpatient Medications:     cholecalciferol (VITAMIN D3) 1,000 units tablet    Phentermine-Topiramate 3.75-23 MG CP24    Allergies   Allergen  Reactions    Naproxen Angioedema, Rash, Hives and Lip Swelling     Reaction Date: 11Jul2011; Category: Allergy;   Reaction Date: 11Jul2011; Category: Allergy;        Objective     BP (!) 199/88   Pulse 101   Temp 99.3 °F (37.4 °C) (Temporal)   Resp 16   SpO2 97%     PHYSICAL EXAM    Gen: NAD AAOx3  Head: Normocephalic, Atraumatic  CV: S1S2 RRR no m/r/g  CHEST: Clear b/l no c/r/w  ABD: soft, +BS NT/ND  EXT: no edema    ASSESSMENT/PLAN:  This is a 49 y.o. year old female here for colonoscopy, and she is stable and optimized for her procedure.

## 2024-09-30 NOTE — PROGRESS NOTES
Assessment/Plan:  Calcium 1000 mg + 600-1000 IU Vit D daily. Pap with high risk HPV Q 5 years Annual mammogram, monthly breast self exam.Exercise 150 minutes per week minimum.        Diagnoses and all orders for this visit:    Encounter for gynecological examination without abnormal finding    Menorrhagia with regular cycle  -     Tranexamic Acid 650 MG TABS; Take 2 tablets (1,300 mg total) by mouth 3 (three) times a day for 5 days 1300 daily    Submucous uterine fibroid - 3.1 cm    Encounter for screening mammogram for malignant neoplasm of breast  -     Mammo screening bilateral w 3d and cad; Future    Other orders  -     Liquid-based pap, screening  -     Discontinue: Tranexamic Acid 650 MG TABS; Take by mouth 2 (two) times a day 1300 daily          Subjective:      Patient ID: Naye Barajas is a 49 y.o. female.    Here for annual gyn  Prior RSO.  Had seen Dr Rucker 2023 for heavy periods q 27-30 day cycles lasting for 7-10 days. Notes large clots during menses and dysmenorrhea. NSAIDs with minimal relief. Also notes mid cycle pain for 7 days. .US 3.1 cm submucosal fibroid abutting endometrium Endometrial bx benign Prescribed Lysteda Is doing well does not use every month  Periods are still monthly denies abd or pelvic pain  bowel and bladder are normal PAP 2022 neg/neg HPV prior 2019 neg/neg No h/o abn pap. Mammo 10/12/2023 normal Colonoscopy 2024 normal repeat in 10 years H/o melanoma         The following portions of the patient's history were reviewed and updated as appropriate: allergies, current medications, past family history, past medical history, past social history, past surgical history, and problem list.    Review of Systems   Constitutional:  Negative for fatigue and unexpected weight change.   Gastrointestinal:  Negative for abdominal distention, abdominal pain, constipation and diarrhea.   Genitourinary:  Negative for difficulty urinating, dyspareunia, dysuria, frequency, genital  "sores, menstrual problem, pelvic pain, urgency, vaginal bleeding, vaginal discharge and vaginal pain.   Neurological:  Negative for headaches.   Psychiatric/Behavioral: Negative.  Negative for dysphoric mood. The patient is not nervous/anxious.          Objective:      /80 (BP Location: Left arm, Patient Position: Sitting, Cuff Size: Standard)   Ht 5' 4\" (1.626 m)   Wt 127 kg (280 lb)   LMP 09/19/2024   BMI 48.06 kg/m²          Physical Exam  Vitals and nursing note reviewed.   Constitutional:       General: She is not in acute distress.     Appearance: Normal appearance.   HENT:      Head: Normocephalic and atraumatic.   Pulmonary:      Effort: Pulmonary effort is normal.   Chest:   Breasts:     Breasts are symmetrical.      Right: Normal. No mass, nipple discharge, skin change or tenderness.      Left: Normal. No mass, nipple discharge, skin change or tenderness.   Abdominal:      General: There is no distension.      Palpations: Abdomen is soft.      Tenderness: There is no abdominal tenderness. There is no guarding or rebound.   Genitourinary:     General: Normal vulva.      Exam position: Lithotomy position.      Labia:         Right: No rash, tenderness, lesion or injury.         Left: No rash, tenderness, lesion or injury.       Urethra: No prolapse, urethral pain, urethral swelling or urethral lesion.      Vagina: Normal. No erythema or lesions.      Cervix: No cervical motion tenderness, discharge, lesion or cervical bleeding.      Uterus: Normal.       Adnexa: Right adnexa normal and left adnexa normal.        Right: No mass or tenderness.          Left: No mass or tenderness.        Rectum: No mass or external hemorrhoid.   Musculoskeletal:         General: Normal range of motion.   Lymphadenopathy:      Upper Body:      Right upper body: No axillary adenopathy.      Left upper body: No axillary adenopathy.      Lower Body: No right inguinal adenopathy. No left inguinal adenopathy.   Skin:     " General: Skin is warm and dry.   Neurological:      Mental Status: She is alert and oriented to person, place, and time.   Psychiatric:         Mood and Affect: Mood normal.         Behavior: Behavior normal.         Thought Content: Thought content normal.         Judgment: Judgment normal.

## 2024-10-01 ENCOUNTER — ANNUAL EXAM (OUTPATIENT)
Dept: OBGYN CLINIC | Facility: CLINIC | Age: 49
End: 2024-10-01
Payer: COMMERCIAL

## 2024-10-01 VITALS
DIASTOLIC BLOOD PRESSURE: 80 MMHG | HEIGHT: 64 IN | WEIGHT: 280 LBS | BODY MASS INDEX: 47.8 KG/M2 | SYSTOLIC BLOOD PRESSURE: 120 MMHG

## 2024-10-01 DIAGNOSIS — D25.0 SUBMUCOUS UTERINE FIBROID: ICD-10-CM

## 2024-10-01 DIAGNOSIS — N92.0 MENORRHAGIA WITH REGULAR CYCLE: ICD-10-CM

## 2024-10-01 DIAGNOSIS — Z12.31 ENCOUNTER FOR SCREENING MAMMOGRAM FOR MALIGNANT NEOPLASM OF BREAST: ICD-10-CM

## 2024-10-01 DIAGNOSIS — Z01.419 ENCOUNTER FOR GYNECOLOGICAL EXAMINATION WITHOUT ABNORMAL FINDING: Primary | ICD-10-CM

## 2024-10-01 PROCEDURE — S0612 ANNUAL GYNECOLOGICAL EXAMINA: HCPCS | Performed by: NURSE PRACTITIONER

## 2024-10-01 RX ORDER — TRANEXAMIC ACID 650 MG/1
TABLET ORAL 2 TIMES DAILY
COMMUNITY
End: 2024-10-01 | Stop reason: SDUPTHER

## 2024-10-01 RX ORDER — TRANEXAMIC ACID 650 MG/1
1300 TABLET ORAL 3 TIMES DAILY
Qty: 30 TABLET | Refills: 11 | Status: SHIPPED | OUTPATIENT
Start: 2024-10-01 | End: 2024-10-06

## 2024-10-01 NOTE — PATIENT INSTRUCTIONS
Calcium 1000 mg + 600-1000 IU Vit D daily. Pap with high risk HPV Q 5 years Annual mammogram, monthly breast self exam.Exercise 150 minutes per week minimum.

## 2024-11-02 NOTE — TELEPHONE ENCOUNTER
Other reason request has been forwarded to provider: lucho cowart Attending Attestation (For Attendings USE Only)...

## 2025-04-03 ENCOUNTER — TELEPHONE (OUTPATIENT)
Age: 50
End: 2025-04-03

## 2025-04-03 NOTE — TELEPHONE ENCOUNTER
Patient is requesting an insurance referral for the following specialty:      Test Name / Order Name: Evaluate & Treat (yearly and needs a spot to looked as well)    DX Code: D48.5    Date Of Service: 4/10/25    Location/Facility Name/Address/Phone #:          [facility] in           [address]          Ph # [phone]  Location / Facility NPI: [id number]    [facility] Phone # To Reach The Patient:     [phone]

## 2025-04-08 NOTE — TELEPHONE ENCOUNTER
Fax: [phone] Can you fax the insurance referral to [facility]?  I don't see the ambulatory referral for Dermatology.

## 2025-06-12 DIAGNOSIS — Z13.220 SCREENING FOR LIPOID DISORDERS: ICD-10-CM

## 2025-06-12 DIAGNOSIS — Z13.0 SCREENING FOR ENDOCRINE, METABOLIC AND IMMUNITY DISORDER: ICD-10-CM

## 2025-06-12 DIAGNOSIS — Z13.0 SCREENING FOR DEFICIENCY ANEMIA: ICD-10-CM

## 2025-06-12 DIAGNOSIS — Z13.29 SCREENING FOR THYROID DISORDER: ICD-10-CM

## 2025-06-12 DIAGNOSIS — Z13.228 SCREENING FOR ENDOCRINE, METABOLIC AND IMMUNITY DISORDER: ICD-10-CM

## 2025-06-12 DIAGNOSIS — Z13.29 SCREENING FOR ENDOCRINE, METABOLIC AND IMMUNITY DISORDER: ICD-10-CM

## 2025-06-25 LAB
ALBUMIN SERPL-MCNC: 4.2 G/DL (ref 3.9–4.9)
ALP SERPL-CCNC: 95 IU/L (ref 44–121)
ALT SERPL-CCNC: 21 IU/L (ref 0–32)
AST SERPL-CCNC: 19 IU/L (ref 0–40)
BASOPHILS # BLD AUTO: 0.1 X10E3/UL (ref 0–0.2)
BASOPHILS NFR BLD AUTO: 1 %
BILIRUB SERPL-MCNC: 0.2 MG/DL (ref 0–1.2)
BUN SERPL-MCNC: 12 MG/DL (ref 6–24)
BUN/CREAT SERPL: 17 (ref 9–23)
CALCIUM SERPL-MCNC: 9.4 MG/DL (ref 8.7–10.2)
CHLORIDE SERPL-SCNC: 105 MMOL/L (ref 96–106)
CHOLEST SERPL-MCNC: 133 MG/DL (ref 100–199)
CHOLEST/HDLC SERPL: 2.6 RATIO (ref 0–4.4)
CO2 SERPL-SCNC: 18 MMOL/L (ref 20–29)
CREAT SERPL-MCNC: 0.72 MG/DL (ref 0.57–1)
EGFR: 102 ML/MIN/1.73
EOSINOPHIL # BLD AUTO: 0.2 X10E3/UL (ref 0–0.4)
EOSINOPHIL NFR BLD AUTO: 3 %
ERYTHROCYTE [DISTWIDTH] IN BLOOD BY AUTOMATED COUNT: 17 % (ref 11.7–15.4)
GLOBULIN SER-MCNC: 2.8 G/DL (ref 1.5–4.5)
GLUCOSE SERPL-MCNC: 105 MG/DL (ref 70–99)
HCT VFR BLD AUTO: 34.7 % (ref 34–46.6)
HDLC SERPL-MCNC: 51 MG/DL
HGB BLD-MCNC: 10.7 G/DL (ref 11.1–15.9)
IMM GRANULOCYTES # BLD: 0.1 X10E3/UL (ref 0–0.1)
IMM GRANULOCYTES NFR BLD: 1 %
LDLC SERPL CALC-MCNC: 67 MG/DL (ref 0–99)
LYMPHOCYTES # BLD AUTO: 1.9 X10E3/UL (ref 0.7–3.1)
LYMPHOCYTES NFR BLD AUTO: 24 %
MCH RBC QN AUTO: 22.9 PG (ref 26.6–33)
MCHC RBC AUTO-ENTMCNC: 30.8 G/DL (ref 31.5–35.7)
MCV RBC AUTO: 74 FL (ref 79–97)
MONOCYTES # BLD AUTO: 0.6 X10E3/UL (ref 0.1–0.9)
MONOCYTES NFR BLD AUTO: 8 %
NEUTROPHILS # BLD AUTO: 5.1 X10E3/UL (ref 1.4–7)
NEUTROPHILS NFR BLD AUTO: 63 %
PLATELET # BLD AUTO: 429 X10E3/UL (ref 150–450)
POTASSIUM SERPL-SCNC: 4.7 MMOL/L (ref 3.5–5.2)
PROT SERPL-MCNC: 7 G/DL (ref 6–8.5)
RBC # BLD AUTO: 4.68 X10E6/UL (ref 3.77–5.28)
SL AMB VLDL CHOLESTEROL CALC: 15 MG/DL (ref 5–40)
SODIUM SERPL-SCNC: 139 MMOL/L (ref 134–144)
TRIGL SERPL-MCNC: 77 MG/DL (ref 0–149)
TSH SERPL DL<=0.005 MIU/L-ACNC: 1.84 UIU/ML (ref 0.45–4.5)
WBC # BLD AUTO: 7.9 X10E3/UL (ref 3.4–10.8)

## 2025-07-01 ENCOUNTER — TELEPHONE (OUTPATIENT)
Age: 50
End: 2025-07-01

## 2025-07-01 DIAGNOSIS — Z12.83 SKIN EXAM, SCREENING FOR CANCER: Primary | ICD-10-CM

## 2025-07-29 ENCOUNTER — OFFICE VISIT (OUTPATIENT)
Dept: FAMILY MEDICINE CLINIC | Facility: CLINIC | Age: 50
End: 2025-07-29
Payer: COMMERCIAL

## 2025-07-29 VITALS
BODY MASS INDEX: 46.98 KG/M2 | HEART RATE: 75 BPM | DIASTOLIC BLOOD PRESSURE: 70 MMHG | TEMPERATURE: 97.5 F | WEIGHT: 282 LBS | OXYGEN SATURATION: 97 % | SYSTOLIC BLOOD PRESSURE: 118 MMHG | HEIGHT: 65 IN

## 2025-07-29 DIAGNOSIS — Z12.31 ENCOUNTER FOR SCREENING MAMMOGRAM FOR BREAST CANCER: ICD-10-CM

## 2025-07-29 DIAGNOSIS — Z00.00 ANNUAL PHYSICAL EXAM: Primary | ICD-10-CM

## 2025-07-29 DIAGNOSIS — D50.9 IRON DEFICIENCY ANEMIA, UNSPECIFIED IRON DEFICIENCY ANEMIA TYPE: ICD-10-CM

## 2025-07-29 PROCEDURE — 99396 PREV VISIT EST AGE 40-64: CPT

## 2025-07-29 RX ORDER — PNV NO.95/FERROUS FUM/FOLIC AC 28MG-0.8MG
TABLET ORAL
COMMUNITY
Start: 2025-06-27

## 2025-08-02 LAB
BASOPHILS # BLD AUTO: 0.1 X10E3/UL (ref 0–0.2)
BASOPHILS NFR BLD AUTO: 1 %
EOSINOPHIL # BLD AUTO: 0.2 X10E3/UL (ref 0–0.4)
EOSINOPHIL NFR BLD AUTO: 3 %
ERYTHROCYTE [DISTWIDTH] IN BLOOD BY AUTOMATED COUNT: 16.2 % (ref 11.7–15.4)
FERRITIN SERPL-MCNC: 19 NG/ML (ref 15–150)
HCT VFR BLD AUTO: 35.3 % (ref 34–46.6)
HGB BLD-MCNC: 10.8 G/DL (ref 11.1–15.9)
IMM GRANULOCYTES # BLD: 0.1 X10E3/UL (ref 0–0.1)
IMM GRANULOCYTES NFR BLD: 1 %
IRON SERPL-MCNC: 22 UG/DL (ref 27–159)
LYMPHOCYTES # BLD AUTO: 2.2 X10E3/UL (ref 0.7–3.1)
LYMPHOCYTES NFR BLD AUTO: 24 %
MCH RBC QN AUTO: 22.7 PG (ref 26.6–33)
MCHC RBC AUTO-ENTMCNC: 30.6 G/DL (ref 31.5–35.7)
MCV RBC AUTO: 74 FL (ref 79–97)
MONOCYTES # BLD AUTO: 0.6 X10E3/UL (ref 0.1–0.9)
MONOCYTES NFR BLD AUTO: 7 %
NEUTROPHILS # BLD AUTO: 5.9 X10E3/UL (ref 1.4–7)
NEUTROPHILS NFR BLD AUTO: 64 %
PLATELET # BLD AUTO: 434 X10E3/UL (ref 150–450)
RBC # BLD AUTO: 4.76 X10E6/UL (ref 3.77–5.28)
WBC # BLD AUTO: 9 X10E3/UL (ref 3.4–10.8)

## 2025-08-12 ENCOUNTER — TELEPHONE (OUTPATIENT)
Dept: FAMILY MEDICINE CLINIC | Facility: CLINIC | Age: 50
End: 2025-08-12

## 2025-08-12 PROBLEM — D50.9 ANEMIA, IRON DEFICIENCY: Status: ACTIVE | Noted: 2025-08-12
